# Patient Record
Sex: FEMALE | Race: WHITE | Employment: FULL TIME | URBAN - METROPOLITAN AREA
[De-identification: names, ages, dates, MRNs, and addresses within clinical notes are randomized per-mention and may not be internally consistent; named-entity substitution may affect disease eponyms.]

---

## 2017-01-19 ENCOUNTER — ANESTHESIA EVENT (OUTPATIENT)
Dept: SURGERY | Facility: CLINIC | Age: 57
End: 2017-01-19
Payer: MEDICARE

## 2017-01-25 NOTE — ANESTHESIA PREPROCEDURE EVALUATION
Anesthesia Evaluation     . Pt has had prior anesthetic. Type: General      ROS/MED HX    ENT/Pulmonary:  - neg pulmonary ROS     Neurologic:  - neg neurologic ROS     Cardiovascular:     (+) Dyslipidemia, hypertension----. : . . . :. . Previous cardiac testing date:results:date: results:ECG reviewed date:5/12/14 results:Sinus Rhythm   Low voltage -possible pulmonary disease.     ABNORMAL    date: results:          METS/Exercise Tolerance:     Hematologic:  - neg hematologic  ROS       Musculoskeletal:  - neg musculoskeletal ROS (+) , , other musculoskeletal- left foot hallux valgus, hardware pain      GI/Hepatic:     (+) Other GI/Hepatic hx of gastric bypass      Renal/Genitourinary:  - ROS Renal section negative       Endo:     (+) thyroid problem hypothyroidism, Obesity, .      Psychiatric:     (+) psychiatric history depression and other (comment) (adjustment disorder )      Infectious Disease:  - neg infectious disease ROS       Malignancy:      - no malignancy   Other:    - neg other ROS           Physical Exam  Normal systems: cardiovascular and pulmonary    Airway   Mallampati: I  TM distance: >3 FB  Neck ROM: full    Dental   (+) upper dentures    Cardiovascular       Pulmonary                     Anesthesia Plan      History & Physical Review  History and physical reviewed and following examination; no interval change.    ASA Status:  2 .    NPO Status:  > 8 hours    Plan for General, LMA and Peripheral Nerve Block with Intravenous and Propofol induction. Maintenance will be Balanced.    PONV prophylaxis:  Ondansetron (or other 5HT-3) and Dexamethasone or Solumedrol  Additional equipment: Videolaryngoscope      Postoperative Care  Postoperative pain management:  IV analgesics and Peripheral nerve block (Single Shot).      Consents  Anesthetic plan, risks, benefits and alternatives discussed with:  Patient..                          .

## 2017-01-26 ENCOUNTER — APPOINTMENT (OUTPATIENT)
Dept: GENERAL RADIOLOGY | Facility: CLINIC | Age: 57
End: 2017-01-26
Attending: PODIATRIST
Payer: MEDICARE

## 2017-01-26 ENCOUNTER — HOSPITAL ENCOUNTER (OUTPATIENT)
Facility: CLINIC | Age: 57
Discharge: HOME | End: 2017-01-26
Attending: PODIATRIST | Admitting: PODIATRIST
Payer: MEDICARE

## 2017-01-26 ENCOUNTER — ANESTHESIA (OUTPATIENT)
Dept: SURGERY | Facility: CLINIC | Age: 57
End: 2017-01-26
Payer: MEDICARE

## 2017-01-26 VITALS
RESPIRATION RATE: 16 BRPM | HEIGHT: 61 IN | DIASTOLIC BLOOD PRESSURE: 81 MMHG | BODY MASS INDEX: 27 KG/M2 | HEART RATE: 77 BPM | WEIGHT: 143 LBS | SYSTOLIC BLOOD PRESSURE: 113 MMHG | TEMPERATURE: 98 F | OXYGEN SATURATION: 94 %

## 2017-01-26 DIAGNOSIS — G89.18 POST-OP PAIN: Primary | ICD-10-CM

## 2017-01-26 PROCEDURE — 25000125 ZZHC RX 250: Performed by: NURSE ANESTHETIST, CERTIFIED REGISTERED

## 2017-01-26 PROCEDURE — 25000125 ZZHC RX 250: Performed by: PODIATRIST

## 2017-01-26 PROCEDURE — C1762 CONN TISS, HUMAN(INC FASCIA): HCPCS | Performed by: PODIATRIST

## 2017-01-26 PROCEDURE — 40000306 ZZH STATISTIC PRE PROC ASSESS II: Performed by: PODIATRIST

## 2017-01-26 PROCEDURE — 25800025 ZZH RX 258: Performed by: NURSE ANESTHETIST, CERTIFIED REGISTERED

## 2017-01-26 PROCEDURE — 37000009 ZZH ANESTHESIA TECHNICAL FEE, EACH ADDTL 15 MIN: Performed by: PODIATRIST

## 2017-01-26 PROCEDURE — 36000058 ZZH SURGERY LEVEL 3 EA 15 ADDTL MIN: Performed by: PODIATRIST

## 2017-01-26 PROCEDURE — 40000277 XR SURGERY CARM FLUORO LESS THAN 5 MIN W STILLS: Mod: TC

## 2017-01-26 PROCEDURE — 37000008 ZZH ANESTHESIA TECHNICAL FEE, 1ST 30 MIN: Performed by: PODIATRIST

## 2017-01-26 PROCEDURE — C1713 ANCHOR/SCREW BN/BN,TIS/BN: HCPCS | Performed by: PODIATRIST

## 2017-01-26 PROCEDURE — 27210794 ZZH OR GENERAL SUPPLY STERILE: Performed by: PODIATRIST

## 2017-01-26 PROCEDURE — 71000027 ZZH RECOVERY PHASE 2 EACH 15 MINS: Performed by: PODIATRIST

## 2017-01-26 PROCEDURE — 36000060 ZZH SURGERY LEVEL 3 W FLUORO 1ST 30 MIN: Performed by: PODIATRIST

## 2017-01-26 PROCEDURE — C1769 GUIDE WIRE: HCPCS | Performed by: PODIATRIST

## 2017-01-26 DEVICE — IMP SCR ARTHREX VAL 3.0X14MM TI AR-8933V-14
Type: IMPLANTABLE DEVICE | Site: FOOT | Status: NON-FUNCTIONAL
Removed: 2020-08-06

## 2017-01-26 DEVICE — GRAFT BONE PUTTY DBX 02.5ML 038025: Type: IMPLANTABLE DEVICE | Site: FOOT | Status: FUNCTIONAL

## 2017-01-26 DEVICE — IMP SCR ARTHREX MTP LP CORTICAL 3X16MM TI AR-8933-16
Type: IMPLANTABLE DEVICE | Site: FOOT | Status: NON-FUNCTIONAL
Removed: 2020-08-06

## 2017-01-26 DEVICE — IMPLANTABLE DEVICE
Type: IMPLANTABLE DEVICE | Site: FOOT | Status: NON-FUNCTIONAL
Removed: 2020-08-06

## 2017-01-26 DEVICE — IMP SCR ARTHREX BLUE LOCKING 3.5X22MM AR-8935L-22
Type: IMPLANTABLE DEVICE | Site: FOOT | Status: NON-FUNCTIONAL
Removed: 2020-08-06

## 2017-01-26 DEVICE — IMP PLATE ARTHREX LAPIDUS AR-8941
Type: IMPLANTABLE DEVICE | Site: FOOT | Status: NON-FUNCTIONAL
Removed: 2020-08-06

## 2017-01-26 DEVICE — IMP SCR ARTHREX QUICKFIX CANC PT 3.0X24MM TI AR-8730-24PT
Type: IMPLANTABLE DEVICE | Site: FOOT | Status: NON-FUNCTIONAL
Removed: 2020-08-06

## 2017-01-26 DEVICE — IMP SCR ARTHREX BLUE LOCKING 3.5X16MM AR-8935L-16
Type: IMPLANTABLE DEVICE | Site: FOOT | Status: NON-FUNCTIONAL
Removed: 2020-08-06

## 2017-01-26 DEVICE — IMP SCR ARTHREX QUICKFIX CAN ST 4.0X36MM TI AR-8740-36PTS
Type: IMPLANTABLE DEVICE | Site: FOOT | Status: NON-FUNCTIONAL
Removed: 2020-08-06

## 2017-01-26 DEVICE — IMP SCR ARTHREX CORTICAL 3.5MMX18MM AR-8935-18
Type: IMPLANTABLE DEVICE | Site: FOOT | Status: NON-FUNCTIONAL
Removed: 2020-08-06

## 2017-01-26 DEVICE — IMP PLATE ARTHREX LOW PRO MTP CNTRD SHORT LT TI AR-8944CL-S
Type: IMPLANTABLE DEVICE | Site: FOOT | Status: NON-FUNCTIONAL
Removed: 2020-08-06

## 2017-01-26 RX ORDER — SODIUM CHLORIDE, SODIUM LACTATE, POTASSIUM CHLORIDE, CALCIUM CHLORIDE 600; 310; 30; 20 MG/100ML; MG/100ML; MG/100ML; MG/100ML
1000 INJECTION, SOLUTION INTRAVENOUS CONTINUOUS
Status: DISCONTINUED | OUTPATIENT
Start: 2017-01-26 | End: 2017-01-26 | Stop reason: HOSPADM

## 2017-01-26 RX ORDER — NALOXONE HYDROCHLORIDE 0.4 MG/ML
.1-.4 INJECTION, SOLUTION INTRAMUSCULAR; INTRAVENOUS; SUBCUTANEOUS
Status: DISCONTINUED | OUTPATIENT
Start: 2017-01-26 | End: 2017-01-26 | Stop reason: HOSPADM

## 2017-01-26 RX ORDER — CEFAZOLIN SODIUM 2 G/100ML
2 INJECTION, SOLUTION INTRAVENOUS
Status: COMPLETED | OUTPATIENT
Start: 2017-01-26 | End: 2017-01-26

## 2017-01-26 RX ORDER — HYDROXYZINE HYDROCHLORIDE 25 MG/1
25 TABLET, FILM COATED ORAL EVERY 6 HOURS PRN
Qty: 36 TABLET | Refills: 0 | Status: SHIPPED
Start: 2017-01-26 | End: 2021-06-27

## 2017-01-26 RX ORDER — ONDANSETRON 2 MG/ML
4 INJECTION INTRAMUSCULAR; INTRAVENOUS EVERY 30 MIN PRN
Status: DISCONTINUED | OUTPATIENT
Start: 2017-01-26 | End: 2017-01-26 | Stop reason: HOSPADM

## 2017-01-26 RX ORDER — HYDROXYZINE HYDROCHLORIDE 25 MG/1
25 TABLET, FILM COATED ORAL EVERY 6 HOURS PRN
Status: DISCONTINUED | OUTPATIENT
Start: 2017-01-26 | End: 2017-01-26 | Stop reason: HOSPADM

## 2017-01-26 RX ORDER — FENTANYL CITRATE 50 UG/ML
INJECTION, SOLUTION INTRAMUSCULAR; INTRAVENOUS PRN
Status: DISCONTINUED | OUTPATIENT
Start: 2017-01-26 | End: 2017-01-26

## 2017-01-26 RX ORDER — LIDOCAINE HCL/EPINEPHRINE/PF 2%-1:200K
VIAL (ML) INJECTION PRN
Status: DISCONTINUED | OUTPATIENT
Start: 2017-01-26 | End: 2017-01-26

## 2017-01-26 RX ORDER — MEPERIDINE HYDROCHLORIDE 25 MG/ML
12.5 INJECTION INTRAMUSCULAR; INTRAVENOUS; SUBCUTANEOUS
Status: DISCONTINUED | OUTPATIENT
Start: 2017-01-26 | End: 2017-01-26 | Stop reason: HOSPADM

## 2017-01-26 RX ORDER — HYDROXYZINE HYDROCHLORIDE 50 MG/1
50 TABLET, FILM COATED ORAL EVERY 6 HOURS PRN
Status: DISCONTINUED | OUTPATIENT
Start: 2017-01-26 | End: 2017-01-26 | Stop reason: HOSPADM

## 2017-01-26 RX ORDER — ONDANSETRON 4 MG/1
4 TABLET, ORALLY DISINTEGRATING ORAL EVERY 30 MIN PRN
Status: DISCONTINUED | OUTPATIENT
Start: 2017-01-26 | End: 2017-01-26 | Stop reason: HOSPADM

## 2017-01-26 RX ORDER — OXYCODONE AND ACETAMINOPHEN 5; 325 MG/1; MG/1
1-2 TABLET ORAL EVERY 4 HOURS PRN
Qty: 38 TABLET | Refills: 0 | Status: SHIPPED | OUTPATIENT
Start: 2017-01-26 | End: 2020-08-04

## 2017-01-26 RX ORDER — HYDROMORPHONE HYDROCHLORIDE 1 MG/ML
.3-.5 INJECTION, SOLUTION INTRAMUSCULAR; INTRAVENOUS; SUBCUTANEOUS EVERY 10 MIN PRN
Status: DISCONTINUED | OUTPATIENT
Start: 2017-01-26 | End: 2017-01-26 | Stop reason: HOSPADM

## 2017-01-26 RX ORDER — CEFAZOLIN SODIUM 1 G/3ML
1 INJECTION, POWDER, FOR SOLUTION INTRAMUSCULAR; INTRAVENOUS SEE ADMIN INSTRUCTIONS
Status: DISCONTINUED | OUTPATIENT
Start: 2017-01-26 | End: 2017-01-26 | Stop reason: HOSPADM

## 2017-01-26 RX ORDER — FENTANYL CITRATE 50 UG/ML
25-50 INJECTION, SOLUTION INTRAMUSCULAR; INTRAVENOUS
Status: CANCELLED | OUTPATIENT
Start: 2017-01-26

## 2017-01-26 RX ORDER — PROPOFOL 10 MG/ML
INJECTION, EMULSION INTRAVENOUS CONTINUOUS PRN
Status: DISCONTINUED | OUTPATIENT
Start: 2017-01-26 | End: 2017-01-26

## 2017-01-26 RX ORDER — DEXAMETHASONE SODIUM PHOSPHATE 4 MG/ML
4 INJECTION, SOLUTION INTRA-ARTICULAR; INTRALESIONAL; INTRAMUSCULAR; INTRAVENOUS; SOFT TISSUE EVERY 10 MIN PRN
Status: DISCONTINUED | OUTPATIENT
Start: 2017-01-26 | End: 2017-01-26 | Stop reason: HOSPADM

## 2017-01-26 RX ORDER — ALBUTEROL SULFATE 0.83 MG/ML
2.5 SOLUTION RESPIRATORY (INHALATION) EVERY 4 HOURS PRN
Status: CANCELLED | OUTPATIENT
Start: 2017-01-26

## 2017-01-26 RX ORDER — ROPIVACAINE HYDROCHLORIDE 5 MG/ML
INJECTION, SOLUTION EPIDURAL; INFILTRATION; PERINEURAL PRN
Status: DISCONTINUED | OUTPATIENT
Start: 2017-01-26 | End: 2017-01-26

## 2017-01-26 RX ORDER — FENTANYL CITRATE 50 UG/ML
25-50 INJECTION, SOLUTION INTRAMUSCULAR; INTRAVENOUS
Status: DISCONTINUED | OUTPATIENT
Start: 2017-01-26 | End: 2017-01-26 | Stop reason: HOSPADM

## 2017-01-26 RX ORDER — LIDOCAINE 40 MG/G
CREAM TOPICAL
Status: DISCONTINUED | OUTPATIENT
Start: 2017-01-26 | End: 2017-01-26 | Stop reason: HOSPADM

## 2017-01-26 RX ORDER — OXYCODONE AND ACETAMINOPHEN 5; 325 MG/1; MG/1
1-2 TABLET ORAL
Status: DISCONTINUED | OUTPATIENT
Start: 2017-01-26 | End: 2017-01-26 | Stop reason: HOSPADM

## 2017-01-26 RX ADMIN — MIDAZOLAM HYDROCHLORIDE 3 MG: 1 INJECTION, SOLUTION INTRAMUSCULAR; INTRAVENOUS at 13:28

## 2017-01-26 RX ADMIN — CEFAZOLIN SODIUM 2 G: 2 INJECTION, SOLUTION INTRAVENOUS at 14:23

## 2017-01-26 RX ADMIN — LIDOCAINE HYDROCHLORIDE 1 ML: 10 INJECTION, SOLUTION INFILTRATION; PERINEURAL at 12:43

## 2017-01-26 RX ADMIN — ROPIVACAINE HYDROCHLORIDE 30 ML: 5 INJECTION, SOLUTION EPIDURAL; INFILTRATION; PERINEURAL at 13:36

## 2017-01-26 RX ADMIN — SODIUM CHLORIDE, POTASSIUM CHLORIDE, SODIUM LACTATE AND CALCIUM CHLORIDE: 600; 310; 30; 20 INJECTION, SOLUTION INTRAVENOUS at 15:45

## 2017-01-26 RX ADMIN — FENTANYL CITRATE 100 MCG: 50 INJECTION, SOLUTION INTRAMUSCULAR; INTRAVENOUS at 13:28

## 2017-01-26 RX ADMIN — LIDOCAINE HYDROCHLORIDE,EPINEPHRINE BITARTRATE 5 ML: 20; .005 INJECTION, SOLUTION EPIDURAL; INFILTRATION; INTRACAUDAL; PERINEURAL at 13:35

## 2017-01-26 RX ADMIN — PROPOFOL 100 MCG/KG/MIN: 10 INJECTION, EMULSION INTRAVENOUS at 14:28

## 2017-01-26 RX ADMIN — FENTANYL CITRATE 100 MCG: 50 INJECTION, SOLUTION INTRAMUSCULAR; INTRAVENOUS at 14:47

## 2017-01-26 RX ADMIN — SODIUM CHLORIDE, POTASSIUM CHLORIDE, SODIUM LACTATE AND CALCIUM CHLORIDE 1000 ML: 600; 310; 30; 20 INJECTION, SOLUTION INTRAVENOUS at 12:44

## 2017-01-26 RX ADMIN — ROPIVACAINE HYDROCHLORIDE 10 ML: 5 INJECTION, SOLUTION EPIDURAL; INFILTRATION; PERINEURAL at 13:37

## 2017-01-26 NOTE — IP AVS SNAPSHOT
MRN:[id number]                      After Visit Summary   1/26/2017    Zuleyka Lou    MRN: [id number]           Thank you!     Thank you for choosing [facility] for your care. Our goal is always to provide you with excellent care. Hearing back from our patients is one way we can continue to improve our services. Please take a few minutes to complete the written survey that you may receive in the mail after you visit with us. Thank you!        Patient Information     Date Of Birth          1960        About your hospital stay     You were admitted on:  January 26, 2017 You last received care in the:  [facility] PreOP/Phase II    You were discharged on:  January 26, 2017       Who to Call     For medical emergencies, please call 911.  For non-urgent questions about your medical care, please call your primary care provider or clinic, [phone]  For questions related to your surgery, please call your surgery clinic        Attending Provider     Provider    Ant Webber DPM       Primary Care Provider Office Phone # Fax #    [facility] [phone]       [facility] [facility] [address]        After Care Instructions     Discharge Instructions       Review discharge instructions as directed by Provider.            Discharge Instructions       Patient to be seen in next 10-14 days.    Please call [facility] at [phone] to make/confirm appointment.            Elevate affected extremity           Ice to affected area       Ice pack to affected area PRN (as needed).            No dressing change       until follow up clinic appointment.            No driving or operating machinery       until the day after procedure            No weight bearing                 Further instructions from your care team                           Same Day Surgery Discharge Instructions  Special Precautions After Surgery - Adult    1. It is not unusual to  feel lightheaded or faint, up to 24 hours after surgery or while taking pain medication.  If you have these symptoms; sit for a few minutes before standing and have someone assist you when getting up.  2. You should rest and relax for the next 24 hours and must have someone stay with you for at least 24 hours after your discharge.  3. DO NOT DRIVE any vehicle or operate mechanical equipment for 24 hours following the end of your surgery.  DO NOT DRIVE while taking narcotic pain medications that have been prescribed by your physician.  If you had a limb operated on, you must be able to use it fully to drive.  4. DO NOT drink alcoholic beverages for 24 hours following surgery or while taking prescription pain medication.  5. Drink clear liquids (apple juice, ginger ale, broth, 7-Up, etc.).  Progress to your regular diet as you feel able.  6. Any questions call your physician and do not make important decisions for 24 hours.    ACTIVITY  ? Off your foot the 1st day or two with foot elevated above heart as much as possible then up as tolerated.     INCISIONAL CARE  ? Cover foot with a plastic bag to keep dry to shower.     Keep appointment to return to the clinic.    Medications:  ? Start a pain pill as soon as you start to feel any pain but at the very latest before bed, then set an alarm thru the 1st night for every 4 hours to check on patient & give a pain pill, may take them as needed the next day.     Additional discharge instructions: Cold pack behind knee to cool blood going to the foot to decrease swelling & pain.  __________________________________________________________________________________________________________________________________  IMPORTANT NUMBERS:    [facility] [place]  [phone], [phone]  Pharmacy:  [phone]  Same Day Surgery:  [phone], Monday - Friday until 8:30 p.m.  Urgent Care:  [phone]  Emergency Room:  [phone]      [facility]:  [phone]                "                                                              [facility]:  [phone] option 1  [facility]:  [phone]     OB Clinic:  [phone]   Surgery Specialty Clinic:  [phone]   Home Medical Equipment: [phone]  [facility]:  [phone]        Pending Results     No orders found from 2017 to 2017.            Admission Information        Provider Department Dept Phone    2017 Ant Webber DPM Wy Preop/Phase [phone]      Your Vitals Were     Blood Pressure Pulse Temperature    115/78 mmHg 77 98  F (36.7  C) (Oral)    Respirations Height Weight    16 1.549 m (5' 1\") 64.864 kg (143 lb)    BMI (Body Mass Index) Pulse Oximetry       27.03 kg/m2 95%       MyChart Information     [facility] lets you send messages to your doctor, view your test results, renew your prescriptions, schedule appointments and more. To sign up, go to [email or web address] . Click on \"Log in\" on the left side of the screen, which will take you to the Welcome page. Then click on \"Sign up Now\" on the right side of the page.     You will be asked to enter the access code listed below, as well as some personal information. Please follow the directions to create your username and password.     Your access code is: [email or web address]  Expires: 2017  4:38 PM     Your access code will  in 90 days. If you need help or a new code, please call your [facility] clinic or [phone].        Care EveryWhere ID     This is your Care EveryWhere ID. This could be used by other organizations to access your [facility] medical records  [id number]           Review of your medicines      START taking        Dose / Directions    hydrOXYzine 25 MG tablet   Commonly known as:  ATARAX   Used for:  Post-op pain        Dose:  25 mg   Take 1 tablet (25 mg) by mouth every 6 hours as needed for itching (and nausea)   Quantity:  36 tablet   Refills:  0       " oxyCODONE-acetaminophen 5-325 MG per tablet   Commonly known as:  PERCOCET   Used for:  Post-op pain        Dose:  1-2 tablet   Take 1-2 tablets by mouth every 4 hours as needed for pain (moderate to severe)   Quantity:  38 tablet   Refills:  0         CONTINUE these medicines which have NOT CHANGED        Dose / Directions    AMLODIPINE BESYLATE PO        Dose:  10 mg   Take 10 mg by mouth every evening   Refills:  0       aspirin 325 MG EC tablet   Used for:  Status post total right knee replacement        Dose:  325 mg   Take 1 tablet (325 mg) by mouth daily   Quantity:  42 tablet   Refills:  0       FLUoxetine 20 MG tablet        Dose:  20 mg   Take 20 mg by mouth daily   Refills:  0       LEVOTHYROXINE SODIUM PO        Dose:  250 mcg   Take 250 mcg by mouth daily   Refills:  0       MELATONIN PO        Dose:  3 mg   Take 3 mg by mouth nightly as needed   Refills:  0       MULTIVITAMIN/IRON PO        Dose:  1 tablet   Take 1 tablet by mouth daily   Refills:  0       * order for DME        Equipment being ordered: continuous passive motion machine   Quantity:  1 Device   Refills:  0       * order for DME   Used for:  Closed fracture of lateral portion of right tibial plateau, initial encounter        Equipment being ordered: Other: CPM Machine Treatment Diagnosis: ORIF right tibial plateau fracture Set max tolerance and increase 2-3 degrees/day as tolerated. Use up to 8 hours daily.   Quantity:  1 Device   Refills:  0       oxyCODONE 5 MG IR tablet   Commonly known as:  ROXICODONE   Used for:  Status post total right knee replacement        Dose:  5-10 mg   Take 1-2 tablets (5-10 mg) by mouth every 3 hours as needed for moderate to severe pain   Quantity:  60 tablet   Refills:  0       TRAZODONE HCL PO        Dose:  100 mg   Take 100 mg by mouth At Bedtime   Refills:  0       VITAMIN B-12 SL        Dose:  1 tablet   Place 1 tablet under the tongue daily   Refills:  0       * Notice:  This list has 2 medication(s)  that are the same as other medications prescribed for you. Read the directions carefully, and ask your doctor or other care provider to review them with you.         Where to get your medicines      These medications were sent to [facility] - [place], [place] - [address]  [address] [facility] [address]     Phone:  [phone]    - hydrOXYzine 25 MG tablet      Some of these will need a paper prescription and others can be bought over the counter. Ask your nurse if you have questions.     Bring a paper prescription for each of these medications    - oxyCODONE-acetaminophen 5-325 MG per tablet             Protect others around you: Learn how to safely use, store and throw away your medicines at www.disposemymeds.org.             Medication List: This is a list of all your medications and when to take them. Check marks below indicate your daily home schedule. Keep this list as a reference.      Medications           Morning Afternoon Evening Bedtime As Needed    AMLODIPINE BESYLATE PO   Take 10 mg by mouth every evening                                aspirin 325 MG EC tablet   Take 1 tablet (325 mg) by mouth daily                                FLUoxetine 20 MG tablet   Take 20 mg by mouth daily                                hydrOXYzine 25 MG tablet   Commonly known as:  ATARAX   Take 1 tablet (25 mg) by mouth every 6 hours as needed for itching (and nausea)                                LEVOTHYROXINE SODIUM PO   Take 250 mcg by mouth daily                                MELATONIN PO   Take 3 mg by mouth nightly as needed                                MULTIVITAMIN/IRON PO   Take 1 tablet by mouth daily                                * order for DME   Equipment being ordered: continuous passive motion machine                                * order for DME   Equipment being ordered: Other: CPM Machine Treatment Diagnosis: ORIF right tibial plateau fracture Set max tolerance and increase 2-3  degrees/day as tolerated. Use up to 8 hours daily.                                oxyCODONE 5 MG IR tablet   Commonly known as:  ROXICODONE   Take 1-2 tablets (5-10 mg) by mouth every 3 hours as needed for moderate to severe pain                                oxyCODONE-acetaminophen 5-325 MG per tablet   Commonly known as:  PERCOCET   Take 1-2 tablets by mouth every 4 hours as needed for pain (moderate to severe)                                TRAZODONE HCL PO   Take 100 mg by mouth At Bedtime                                VITAMIN B-12 SL   Place 1 tablet under the tongue daily                                * Notice:  This list has 2 medication(s) that are the same as other medications prescribed for you. Read the directions carefully, and ask your doctor or other care provider to review them with you.

## 2017-01-26 NOTE — ANESTHESIA CARE TRANSFER NOTE
Patient: Zuleyka Lou    ARTHRODESIS TOE(S) (Left Toe)  Additional InformationProcedure(s):  Left foot: revision 1st tarsal metatarsal fusion hardware removal, 1st metatarsal phalangeal joint fusion, bone grafting - Wound Class: I-Clean    Diagnosis: Left foot hallux balgus/rigidus, hardware pain  Diagnosis Additional Information: No value filed.    Anesthesia Type:   General, LMA, Peripheral Nerve Block     Note:  Airway :Room Air  Patient transferred to:Phase II        Vitals: (Last set prior to Anesthesia Care Transfer)              Electronically Signed By: WILMAR Piedra CRNA  January 26, 2017  4:12 PM

## 2017-01-26 NOTE — DISCHARGE INSTRUCTIONS
Same Day Surgery Discharge Instructions  Special Precautions After Surgery - Adult    1. It is not unusual to feel lightheaded or faint, up to 24 hours after surgery or while taking pain medication.  If you have these symptoms; sit for a few minutes before standing and have someone assist you when getting up.  2. You should rest and relax for the next 24 hours and must have someone stay with you for at least 24 hours after your discharge.  3. DO NOT DRIVE any vehicle or operate mechanical equipment for 24 hours following the end of your surgery.  DO NOT DRIVE while taking narcotic pain medications that have been prescribed by your physician.  If you had a limb operated on, you must be able to use it fully to drive.  4. DO NOT drink alcoholic beverages for 24 hours following surgery or while taking prescription pain medication.  5. Drink clear liquids (apple juice, ginger ale, broth, 7-Up, etc.).  Progress to your regular diet as you feel able.  6. Any questions call your physician and do not make important decisions for 24 hours.    ACTIVITY  ? Off your foot the 1st day or two with foot elevated above heart as much as possible then up as tolerated.     INCISIONAL CARE  ? Cover foot with a plastic bag to keep dry to shower.     Keep appointment to return to the clinic.    Medications:  ? Start a pain pill as soon as you start to feel any pain but at the very latest before bed, then set an alarm thru the 1st night for every 4 hours to check on patient & give a pain pill, may take them as needed the next day.     Additional discharge instructions: Cold pack behind knee to cool blood going to the foot to decrease swelling & pain.  __________________________________________________________________________________________________________________________________  IMPORTANT NUMBERS:    [facility] [place] Main Number:  [phone], [phone]  Pharmacy:  [phone]  Same Day Surgery:  [phone], Monday -  Friday until 8:30 p.m.  Urgent Care:  [phone]  Emergency Room:  [phone]      [facility]:  [phone]                                                                             [facility]:  [phone] option 1  [facility]:  [phone]     OB Clinic:  [phone]   Surgery Specialty Clinic:  [phone]   Home Medical Equipment: [phone]  [facility]:  [phone]

## 2017-01-26 NOTE — BRIEF OP NOTE
[facility] OR   Brief Operative Note    Pre-operative diagnosis: Left foot hallux balgus/rigidus, hardware pain   Post-operative diagnosis * No post-op diagnosis entered *   Procedure: Procedure(s):  Left foot 1st Metatarsophalangeal realignment arthrodesis, Tarsometatarsal revision arthrodesis and hardware removal-choice anes popliteal nerve block - Wound Class: I-Clean   Surgeon: Ant Webber DPM   Anesthesia: Combined General with Popliteal Block    Estimated blood loss: Less than 10 ml   Blood transfusion: No transfusion was given during surgery   Drains: None   Specimens: None   Findings: Left foot: 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  Thin cortical bone appreciated.  Hallux varus with adaptive joint changes and arthrosis about the 1st MTPJ.   Complications: None   Condition: Stable   Comments: See dictated operative report for full details.           Ant Webber DPM, FACFAS  Foot & Ankle Surgeon/Specialist  [facility]

## 2017-01-26 NOTE — ANESTHESIA PROCEDURE NOTES
Peripheral nerve/Neuraxial procedure note : sciatic, saphenous and popliteal  Pre-Procedure  Performed by  AILYN WISEMAN   Location: pre-op    Procedure Times:1/26/2017 1:25 PM and 1/26/2017 1:41 PM  Pre-Anesthestic Checklist: patient identified, IV checked, site marked, risks and benefits discussed, informed consent, monitors and equipment checked, pre-op evaluation, at physician/surgeon's request and post-op pain management    Timeout  Correct Patient: Yes   Correct Procedure: Yes   Correct Site: Yes   Correct Laterality: Yes   Correct Position: Yes   Site Marked: Yes   .   Procedure Documentation    .    Procedure:    Sciatic, saphenous and popliteal.  Local skin infiltrated with mL of 1% lidocaine.     Ultrasound used to identify targeted nerve, plexus, or vascular marker and placed a needle adjacent to it. A permanent image is entered into the patient's record.  Patient Prep;mask, sterile gloves, chlorhexidine gluconate and isopropyl alcohol, patient draped.  Nerve Stim: Initial Level 1 mA.  Lowest motor response 0.42 mA..  Needle: insulated, short bevel (20 G. 80 mm 4 in). .  Spinal Needle: . . Insertion Method: Single Shot.     Assessment/Narrative  Paresthesias: No.  Injection made incrementally with aspirations every 5 mL..  The placement was negative for: blood aspirated, painful injection and site bleeding.  Bolus given via needle. No blood aspirated via catheter.   Secured via.   Complications: none. Test dose of 5 mL lidocaine 2% w/ 1:200,000 epinephrine at 13:35.  Test dose negative for signs of intravascular, subdural or intrathecal injection. Comments:  Total volume injected at Sciatic nerve:30    Total volume injected at Saphenous Nerve:10

## 2017-01-26 NOTE — IP AVS SNAPSHOT
[facility] PreOP/Phase II    [address] [address]    Phone:  [phone]    Fax:  [phone]                                       After Visit Summary   1/26/2017    Zuleyka Lou    MRN: [id number]           After Visit Summary Signature Page     I have received my discharge instructions, and my questions have been answered. I have discussed any challenges I see with this plan with the nurse or doctor.    ..........................................................................................................................................  Patient/Patient Representative Signature      ..........................................................................................................................................  Patient Representative Print Name and Relationship to Patient    ..................................................               ................................................  Date                                            Time    ..........................................................................................................................................  Reviewed by Signature/Title    ...................................................              ..............................................  Date                                                            Time

## 2017-01-26 NOTE — ANESTHESIA POSTPROCEDURE EVALUATION
Patient: Zuleyka Lou    ARTHRODESIS TOE(S) (Left Toe)  Additional InformationProcedure(s):  Left foot: revision 1st tarsal metatarsal fusion hardware removal, 1st metatarsal phalangeal joint fusion, bone grafting - Wound Class: I-Clean    Diagnosis:Left foot hallux balgus/rigidus, hardware pain  Diagnosis Additional Information: No value filed.    Anesthesia Type:  General, LMA, Peripheral Nerve Block    Note:  Anesthesia Post Evaluation    Patient location during evaluation: Bedside  Patient participation: Able to fully participate in evaluation  Level of consciousness: awake  Pain management: adequate  Airway patency: patent  Cardiovascular status: stable  Respiratory status: room air  Hydration status: stable  PONV: none     Anesthetic complications: None          Last vitals:  Filed Vitals:    01/26/17 1221 01/26/17 1347 01/26/17 1412   BP: 129/82 119/78 115/71   Pulse: 83 82 77   Temp: 36.7  C (98  F)     Resp: 16 16 16   SpO2: 93% 99% 100%       Electronically Signed By: WILMAR Piedra CRNA  January 26, 2017  4:13 PM

## 2017-01-26 NOTE — OP NOTE
Operative report will be dictated/completed.    Ant Webber DPM, FACFAS  Foot & Ankle Surgeon/Specialist  [facility]

## 2017-01-27 NOTE — OP NOTE
DATE OF SURGERY:  01/26/2017      SURGEON:  Ant Webber DPM, [facility]      ASSISTANT:  Francisco Dawkins, PGY3      PREOPERATIVE DIAGNOSIS:   1.  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   2.  Left hallux varus deformity contracture with pain.   3.  Osteoporosis, osteopenia.      POSTOPERATIVE DIAGNOSIS:   1.  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   2.  Left hallux varus deformity contracture and associated arthrosis  3.  Osteoporosis, osteopenia.      PROCEDURE PERFORMED:   1.  Revision, left first tarsometatarsal arthrodesis and autogenous bone grafting.   2.  Hardware removal, left first tarsometatarsal joint.   3.  Left first metatarsophalangeal joint realignment arthrodesis to address hallux varus with autogenous bone grafting.   4.  Intraoperative fluoroscopy.   5.  Posterior splint application below the knee, ankle neutral, fiberglass     HEMOSTASIS:  Left ankle tourniquet 250 mmHg.      ESTIMATED BLOOD LOSS:  Less than 10 cc.      FINDINGS:  Compromised hardware, left first tarsometatarsal joint, with three broken screws.  Gross nonunion about the first tarsometatarsal joint with osteopenia and osteoporosis appreciated.  Distally, the first metatarsophalangeal joint demonstrated fixed hallux varus with underlying arthrosis.      IMPLANTS:  Arthrex 4.0 compression screw from plantar distal to proximal dorsal with a medial-sided locking lateral plate.  To fixate the first metatarsophalangeal joint, a 3.0 compression screw and dorsal anatomic locking contour plate were utilized.  Autogenous bone grafting and bone filler utilized at the first tarsometatarsal joint as well as the first metatarsophalangeal joint.      INDICATIONS FOR THE OPERATION:  Ms. Zuleyka Lou is a 57-year-old who has been followed and evaluated in clinic for a left foot failed tarsometatarsal arthrodesis and hallux varus.  She has failed multiple forms of nonoperative care for  this, and elected to pursue surgical treatment after thorough discussion of the associated pros, cons, risks, benefits, alternatives, postoperative course and details.  She had a CT scan confirming the nonunion and compromised hardware as well.  She gave verbal and written consent for the procedure.      DESCRIPTION OF THE PROCEDURE:  The patient was identified in the preoperative holding area.  The surgical site was marked, the extremity initialed, H&P reviewed, and consent confirmed.  She was then transported to the OR, and placed supine on the OR table.  IV antibiotics were administered, monitored anesthesia care was administered, and a preoperative popliteal block was performed by Anesthesia.  The left foot and ankle were prepped and draped sterilely.  Timeout was performed to identify the proper patient, surgical site, and procedure to be performed.  Esmarch was utilized to exsanguinate the left foot, and the ankle tourniquet inflated for the duration of the procedure.  A dorsomedial incision was made about the first tarsometatarsal joint extending distally to the MTPJ.  This was dissected down in layers with neurovascular identification and retraction.  Proximally, the plate was dissected out and was found to be grossly compromised.  The distal screws were readily removable.  The proximal screws were removed with the screw removal set.  The compression screw did require some curettage dorsally, and this was able to be removed.  It was compromised.  There was gross nonunion appreciated at the first tarsometatarsal joint.  This was openly debrided with a sagittal saw and removal of remaining nonviable bone.  Additional preparation was completed with fish-scaling and subchondral bone drilling.  This was combined with some autogenous bone from the area as well as with DBX bone filler packed back into the joint.  The compression screw was then placed with intraoperative fluoroscopic assistance from plantar distal  to proximal dorsal about the joint for compression.  This was then augmented medially with a Lapidus arthrodesis plate combined with locking and nonlocking screws for additional compression and alignment about the joint.  Stable and rigid internal fixation with anatomic alignment appreciated.  This site was then irrigated and closed proximally with 2-0 and 3-0 Vicryl and 3-0 nylon.  The excision was then distal.  The metatarsophalangeal joint was openly inspected, and it was found to be with gross hallux varus contracture and adaptive arthritic changes.  This was openly dissected.  Then we repaired the joint for arthrodesis.  Cup and cone reaming with 20 mm cup and cone reamers was conducted to violate the subchondral bone to allow for bone fusion.  This was further fenestrated with a 2.5 drill bit.  It was then aligned anatomically, and a 3.0 compression screw was placed, and a dorsal anatomic locking plate was applied with locking and nonlocking screws.  The site of the arthrodesis was confirmed with intraoperative fluoroscopy.  There was some first ray shortening appreciated.  This site was then all irrigated, and closure completed in layers with 2-0 and 3-0 Vicryl and 3-0 nylon, and a compressive sterile dressing was applied.  Vascular status was intact with deflation of the tourniquet.  Then, a posterior neutral ankle splint below the knee, fiberglass ankle neutral was applied.      Case and care reviewed today with the patient and family member postoperatively.  Postoperative instruction sheet was provided.  Orders were placed for p.o. pain management, DVT prophylaxis, and nonweightbearing assistive devices.  Clinical follow-up in 10-14 days for recheck and suture removal, and they will contact the clinic with any other interval events or concerns.         MIRA RAND DPM             D: 01/26/2017 16:20   T: 01/27/2017 03:39   MT: [id number]      Name:     VASU DILLON   MRN:      [id number]        Account:         [id number]   :      1960           Procedure Date: 2017      Document: [id number]       cc: [facility]

## 2019-09-27 ENCOUNTER — HOSPITAL ENCOUNTER (OUTPATIENT)
Facility: CLINIC | Age: 59
End: 2019-09-27
Attending: SURGERY | Admitting: SURGERY
Payer: MEDICARE

## 2019-12-30 ENCOUNTER — HOSPITAL ENCOUNTER (OUTPATIENT)
Dept: ULTRASOUND IMAGING | Facility: CLINIC | Age: 59
Discharge: HOME | End: 2019-12-30
Attending: PHYSICIAN ASSISTANT | Admitting: PHYSICIAN ASSISTANT
Payer: MEDICARE

## 2019-12-30 DIAGNOSIS — I80.02 SUPERFICIAL PHLEBITIS OF LEFT LEG: ICD-10-CM

## 2019-12-30 PROCEDURE — 93971 EXTREMITY STUDY: CPT | Mod: LT

## 2020-07-30 DIAGNOSIS — Z11.59 ENCOUNTER FOR SCREENING FOR OTHER VIRAL DISEASES: Primary | ICD-10-CM

## 2020-08-04 DIAGNOSIS — Z11.59 ENCOUNTER FOR SCREENING FOR OTHER VIRAL DISEASES: ICD-10-CM

## 2020-08-04 PROCEDURE — U0003 INFECTIOUS AGENT DETECTION BY NUCLEIC ACID (DNA OR RNA); SEVERE ACUTE RESPIRATORY SYNDROME CORONAVIRUS 2 (SARS-COV-2) (CORONAVIRUS DISEASE [COVID-19]), AMPLIFIED PROBE TECHNIQUE, MAKING USE OF HIGH THROUGHPUT TECHNOLOGIES AS DESCRIBED BY CMS-2020-01-R: HCPCS | Performed by: PODIATRIST

## 2020-08-05 ENCOUNTER — ANESTHESIA EVENT (OUTPATIENT)
Dept: SURGERY | Facility: CLINIC | Age: 60
End: 2020-08-05
Payer: COMMERCIAL

## 2020-08-05 LAB
SARS-COV-2 RNA SPEC QL NAA+PROBE: NOT DETECTED
SPECIMEN SOURCE: NORMAL

## 2020-08-05 NOTE — BRIEF OP NOTE
[facility]   Brief Operative Note    Pre-operative diagnosis: Hallux valgus of left foot [M20.11]   Post-operative diagnosis * No post-op diagnosis entered *   Procedure: Procedure(s):  Hallux Interphalangeal Joint Resection Arthroplasty,Extensor Tendon Lengthening,Flexor Tendon Evaluationl  1st Metatarsophalangeal Joint and Tarsometatarsal Hardware Removal   Surgeon: Ant Webber DPM   Anesthesia: Monitor Anesthesia Care    Estimated blood loss: 50 mL   Blood transfusion: No transfusion was given during surgery   Drains: None   Specimens:  None, hardware removed.   Findings:  Left hallux IPJ dorsiflexion contracture post prior MTPJ and TMT fusion with extensor contracture and flexor instability associated with contracture.  Left fourth toe adductovarus contracture with interdigital impingement, osseous   Complications: No direct complications encountered throughout procedures.   Condition: Stable  Transferred to post-anesthesia recovery   Comments: See dictated operative report for full details.           Ant Webber DPM, FACFAS  Foot & Ankle Surgeon/Specialist  [facility]

## 2020-08-05 NOTE — OP NOTE
[facility] ORTHOPEDICS OPERATIVE REPORT  Operative Report - Orthopedics  Zuleyka Lou,  1960, MRN [id number]    Surgery Date: 20    PCP: Clinic, [facility], [phone]   Code status:  Full Code       OPERATION SITE:  [facility] Operating Room       OPERATIVE REPORT  DR. ANT WEBBER  FOOT & ANKLE SURGEON  [facility]    DATE OF PROCEDURE: 20    SITE: [facility] Operating Room    SURGEON: Dr. Ant Webber - [facility]    ASSISTANT: Octavio Greene-  Surgeon - [facility]      Pre-Operative Diagnosis:  1.  Post prior left MTPJ fusion, first TMT fusion with retained hardware, hardware associated pain post multiple revision surgeries  2.  Left hallux IPJ extension contracture  3.  Left fourth toe adductovarus contracture    Post-Operative Diagnosis:  1.  Post prior left MTPJ fusion, first TMT fusion with retained hardware, hardware associated pain post multiple revision surgeries  2.  Left hallux IPJ extension contracture  3.  Left fourth toe adductovarus contracture    Procedures Performed:  1.  Deep hardware removal left foot 2 sites, prior MTPJ fusion site and TMT fusion site plate and screws  2.  Left hallux IPJ resection arthroplasty extensor z tenotomy lengthening  3.  Left fourth toe PIPJ realignment arthrodesis with inferior flexor tendon release tenotomy middle level middle phalanx  4.  Intraoperative fluoroscopy    Anesthesia: Monitored Anesthesia Care with Local/Regional  Hemostasis: Ankle Tourniquet at 250mmHg  EBL: < 10 mL  Findings:  Left hallux IPJ dorsiflexion contracture post prior MTPJ and TMT fusion with extensor contracture and flexor instability associated with contracture.  Left fourth toe adductovarus contracture with interdigital impingement, osseous  Implants: None.  Hardware removed.  Specimens: None    Indications for the Operation:  The patient has been seen and evaluated in clinic for  the above-mentioned diagnoses.  They have failed to respond to nonoperative care measures and/or surgical care for condition was indicated.  They have elected to proceed as recommended/indicated with surgical care after a thorough discussion of the associated pros, cons, risks and benefits of the operations as well as the postoperative course and details.  All associated questions were answered.  Verbal and written form informed consent was obtained.  Please see additional information within the clinical notes.    Description of the Procedure:  Patient was seen and evaluated in the preoperative holding area.  The surgical site was marked.  The consent was signed.  The H&P was updated/reviewed.  Patient was transported from the preoperative holding area to the surgical suite.  The patient was placed on the operative table.  Anesthesia was obtained.  Antibiotics were administered via IV.  Tourniquet was applied.  The operative extremity was prepped and draped sterilely.  Then, a timeout was performed to identify the proper patient, surgical site and the procedures to be performed.  Local anesthetic was infiltrated about the operative margins for regional blockade utilizing a one-to-one mixture of 2% lidocaine plain and 0.5% Marcaine plain approximately 30 cc of the mixture was utilized.  The foot/ankle was exsanguinated, and the tourniquet was inflated.    Attention was then directed to the left foot.  Dorsal medial incision made about the first TMT and MTPJ following prior incisional lines.  This was completed with a surgical #15 blade.  This was then dissected out in layers with neurovascular notification and retraction.  Careful dissection down to the periosteum on the plate of the first TMT and MTPJ was conducted.  The plate and screws as well as compression screws were able to be removed in total utilizing the appropriate drivers.  Confirmatory x-ray confirmed complete removal.  Then the incision was extended to  the dorsal hallux IPJ.  Resection of the distal aspect of the proximal phalanx was conducted.  The extensor tendon was tenotomized with a Z tenotomy lengthening. Then after thorough irrigation layered closure completed with the tone foot neutral position 2-0 and 3-0 Vicryl with 3-0 nylon.  Attention was then directed to the left fourth toe adductovarus contracture with interdigital impingement.  Dorsal incision made with a 15 blade at the level of the PIPJ.  This was dissected down a transverse incision was made at the PIPJ.  And approximated resection of the distal phalanx was conducted with an appropriately sized sagittal saw to correct the toe, taken out of adductovarus.  An inferior flexor tendon release was also conducted at the base of the fourth toe, middle and middle phalanx with a percutaneous #15 blade approach.  This improve the position of the fifth toe out of adductovarus and improve the interdigital impingement appreciated between the fourth and fifth digits that was quite symptomatic for the patient.    Following this, thorough irrigation of the surgical sites was conducted.  Layered, anatomic closure completed with 2-0 Vicryl, 3-0 Vicryl and 3-0 nylon with careful apposition of the skin and surfaces for primary healing.  A compressive sterile splint/dressing was applied.  Vascular status was intact after deflation of the tourniquet.  COMPLICATIONS: No direct complications encountered throughout the case.    The patient tolerated the procedure & anesthesia well.  They were transported from the operative suite to the postoperative holding area.  The patient was given postoperative orders as well as specific postoperative instructions which were reviewed by the nursing staff.  Orders were placed for weightbearing status/activity, postoperative oral pain management, DVT prophylaxis measures both with mechanical and medicinal measures reviewed.  Splint/dressing care measures were reviewed as well as  appropriate cryotherapy measures and nutrition.  Postoperative follow-up to be conducted in the next 10-14 days for outpatient clinical follow-up in the Orthopedic clinic at [facility].  If concerns or questions arise or develop they will contact our clinic and postoperative contact numbers provided.  Case details and post-operative care requirements reviewed with family/support present today.  Additionally, a detailed postoperative instruction sheet was provided to the patient and family.  All additional questions were answered postoperatively.    Post Operative Plan:  1. Activity: Weightbearing as tolerated in protective surgical shoe with assistive devices  2. Assistive Devices: Cane or walker recommended at this time.  3. Pain Management: PO pain management prescribed as reviewed in pre-op with patient.  4. Dressing Care: Postoperative splint-dressing to remain clean dry and in place or be changed in 7 to 10 days as instructed.  5. DVT prevention: knee exercises and ankle pump exercises reviewed.  ASA as prescribed.    6. Infection prevention: pre-op IV antibiotics completed.  PO Rx as indicated.  7. Disposition: to home self care with assistance and assistive devices.  8. Clinical Follow-up: as arranged from clinic in 10-14 days post op.    Please note that this report was completed with the assistance of voice recognition and transcription services.  Although every effort has been made to correct and avoid errors, errors may remain.    Dr. Ant Webber, DPM, [facility]  Foot & Ankle Surgeon/Specialist  [facility]          CC: [facility], Dr. Webber's Clinical Team

## 2020-08-06 ENCOUNTER — ANESTHESIA (OUTPATIENT)
Dept: SURGERY | Facility: CLINIC | Age: 60
End: 2020-08-06
Payer: COMMERCIAL

## 2020-08-06 ENCOUNTER — HOSPITAL ENCOUNTER (OUTPATIENT)
Facility: CLINIC | Age: 60
Discharge: HOME | End: 2020-08-06
Attending: PODIATRIST | Admitting: PODIATRIST
Payer: COMMERCIAL

## 2020-08-06 ENCOUNTER — APPOINTMENT (OUTPATIENT)
Dept: GENERAL RADIOLOGY | Facility: CLINIC | Age: 60
End: 2020-08-06
Attending: PODIATRIST
Payer: COMMERCIAL

## 2020-08-06 VITALS
TEMPERATURE: 98.5 F | SYSTOLIC BLOOD PRESSURE: 118 MMHG | RESPIRATION RATE: 15 BRPM | DIASTOLIC BLOOD PRESSURE: 80 MMHG | HEART RATE: 66 BPM | HEIGHT: 62 IN | BODY MASS INDEX: 31.47 KG/M2 | WEIGHT: 171 LBS | OXYGEN SATURATION: 95 %

## 2020-08-06 DIAGNOSIS — G89.18 ACUTE POST-OPERATIVE PAIN: Primary | ICD-10-CM

## 2020-08-06 PROCEDURE — 25800030 ZZH RX IP 258 OP 636: Performed by: NURSE ANESTHETIST, CERTIFIED REGISTERED

## 2020-08-06 PROCEDURE — 40000277 XR SURGERY CARM FLUORO LESS THAN 5 MIN W STILLS

## 2020-08-06 PROCEDURE — 37000008 ZZH ANESTHESIA TECHNICAL FEE, 1ST 30 MIN: Performed by: PODIATRIST

## 2020-08-06 PROCEDURE — 37000009 ZZH ANESTHESIA TECHNICAL FEE, EACH ADDTL 15 MIN: Performed by: PODIATRIST

## 2020-08-06 PROCEDURE — C1762 CONN TISS, HUMAN(INC FASCIA): HCPCS | Performed by: PODIATRIST

## 2020-08-06 PROCEDURE — 25000125 ZZHC RX 250: Performed by: NURSE ANESTHETIST, CERTIFIED REGISTERED

## 2020-08-06 PROCEDURE — 25000128 H RX IP 250 OP 636: Performed by: PODIATRIST

## 2020-08-06 PROCEDURE — 25000128 H RX IP 250 OP 636: Performed by: NURSE ANESTHETIST, CERTIFIED REGISTERED

## 2020-08-06 PROCEDURE — 25000132 ZZH RX MED GY IP 250 OP 250 PS 637: Performed by: PODIATRIST

## 2020-08-06 PROCEDURE — 36000058 ZZH SURGERY LEVEL 3 EA 15 ADDTL MIN: Performed by: PODIATRIST

## 2020-08-06 PROCEDURE — 25000132 ZZH RX MED GY IP 250 OP 250 PS 637: Performed by: NURSE ANESTHETIST, CERTIFIED REGISTERED

## 2020-08-06 PROCEDURE — 40000305 ZZH STATISTIC PRE PROC ASSESS I: Performed by: PODIATRIST

## 2020-08-06 PROCEDURE — 27210794 ZZH OR GENERAL SUPPLY STERILE: Performed by: PODIATRIST

## 2020-08-06 PROCEDURE — 25000125 ZZHC RX 250: Performed by: PODIATRIST

## 2020-08-06 PROCEDURE — 36000060 ZZH SURGERY LEVEL 3 W FLUORO 1ST 30 MIN: Performed by: PODIATRIST

## 2020-08-06 PROCEDURE — C1713 ANCHOR/SCREW BN/BN,TIS/BN: HCPCS | Performed by: PODIATRIST

## 2020-08-06 PROCEDURE — 71000027 ZZH RECOVERY PHASE 2 EACH 15 MINS: Performed by: PODIATRIST

## 2020-08-06 DEVICE — GRAFT BONE PUTTY ARTHREX DBM STIMUBLAST 3ML ABS-2004-03: Type: IMPLANTABLE DEVICE | Site: FOOT | Status: FUNCTIONAL

## 2020-08-06 DEVICE — IMP PIN ARTHREX TRIM IT 1.5X100MM AR-4151DS: Type: IMPLANTABLE DEVICE | Site: FOOT | Status: FUNCTIONAL

## 2020-08-06 RX ORDER — CEFAZOLIN SODIUM 1 G/50ML
1 INJECTION, SOLUTION INTRAVENOUS SEE ADMIN INSTRUCTIONS
Status: DISCONTINUED | OUTPATIENT
Start: 2020-08-06 | End: 2020-08-06 | Stop reason: HOSPADM

## 2020-08-06 RX ORDER — FENTANYL CITRATE 50 UG/ML
25-50 INJECTION, SOLUTION INTRAMUSCULAR; INTRAVENOUS
Status: CANCELLED | OUTPATIENT
Start: 2020-08-06

## 2020-08-06 RX ORDER — LIDOCAINE HYDROCHLORIDE 10 MG/ML
INJECTION, SOLUTION INFILTRATION; PERINEURAL PRN
Status: DISCONTINUED | OUTPATIENT
Start: 2020-08-06 | End: 2020-08-06 | Stop reason: HOSPADM

## 2020-08-06 RX ORDER — PROPOFOL 10 MG/ML
INJECTION, EMULSION INTRAVENOUS CONTINUOUS PRN
Status: DISCONTINUED | OUTPATIENT
Start: 2020-08-06 | End: 2020-08-06

## 2020-08-06 RX ORDER — HYDROXYZINE PAMOATE 25 MG/1
25 CAPSULE ORAL 3 TIMES DAILY PRN
Qty: 26 CAPSULE | Refills: 0 | Status: SHIPPED | OUTPATIENT
Start: 2020-08-06

## 2020-08-06 RX ORDER — MAGNESIUM SULFATE HEPTAHYDRATE 40 MG/ML
2 INJECTION, SOLUTION INTRAVENOUS ONCE
Status: COMPLETED | OUTPATIENT
Start: 2020-08-06 | End: 2020-08-06

## 2020-08-06 RX ORDER — KETOROLAC TROMETHAMINE 30 MG/ML
30 INJECTION, SOLUTION INTRAMUSCULAR; INTRAVENOUS EVERY 6 HOURS PRN
Status: DISCONTINUED | OUTPATIENT
Start: 2020-08-07 | End: 2020-08-06 | Stop reason: HOSPADM

## 2020-08-06 RX ORDER — FENTANYL CITRATE 50 UG/ML
INJECTION, SOLUTION INTRAMUSCULAR; INTRAVENOUS PRN
Status: DISCONTINUED | OUTPATIENT
Start: 2020-08-06 | End: 2020-08-06

## 2020-08-06 RX ORDER — DEXAMETHASONE SODIUM PHOSPHATE 4 MG/ML
INJECTION, SOLUTION INTRA-ARTICULAR; INTRALESIONAL; INTRAMUSCULAR; INTRAVENOUS; SOFT TISSUE PRN
Status: DISCONTINUED | OUTPATIENT
Start: 2020-08-06 | End: 2020-08-06

## 2020-08-06 RX ORDER — LIDOCAINE 40 MG/G
CREAM TOPICAL
Status: DISCONTINUED | OUTPATIENT
Start: 2020-08-06 | End: 2020-08-06 | Stop reason: HOSPADM

## 2020-08-06 RX ORDER — KETOROLAC TROMETHAMINE 30 MG/ML
INJECTION, SOLUTION INTRAMUSCULAR; INTRAVENOUS PRN
Status: DISCONTINUED | OUTPATIENT
Start: 2020-08-06 | End: 2020-08-06

## 2020-08-06 RX ORDER — ONDANSETRON 4 MG/1
4 TABLET, ORALLY DISINTEGRATING ORAL EVERY 30 MIN PRN
Status: DISCONTINUED | OUTPATIENT
Start: 2020-08-06 | End: 2020-08-06 | Stop reason: HOSPADM

## 2020-08-06 RX ORDER — OXYCODONE AND ACETAMINOPHEN 5; 325 MG/1; MG/1
1 TABLET ORAL
Status: COMPLETED | OUTPATIENT
Start: 2020-08-06 | End: 2020-08-06

## 2020-08-06 RX ORDER — KETOROLAC TROMETHAMINE 30 MG/ML
30 INJECTION, SOLUTION INTRAMUSCULAR; INTRAVENOUS EVERY 6 HOURS PRN
Status: DISCONTINUED | OUTPATIENT
Start: 2020-08-06 | End: 2020-08-06

## 2020-08-06 RX ORDER — FENTANYL CITRATE 50 UG/ML
25-50 INJECTION, SOLUTION INTRAMUSCULAR; INTRAVENOUS
Status: DISCONTINUED | OUTPATIENT
Start: 2020-08-06 | End: 2020-08-06 | Stop reason: HOSPADM

## 2020-08-06 RX ORDER — CELECOXIB 200 MG/1
200 CAPSULE ORAL ONCE
Status: COMPLETED | OUTPATIENT
Start: 2020-08-06 | End: 2020-08-06

## 2020-08-06 RX ORDER — SODIUM CHLORIDE, SODIUM LACTATE, POTASSIUM CHLORIDE, CALCIUM CHLORIDE 600; 310; 30; 20 MG/100ML; MG/100ML; MG/100ML; MG/100ML
INJECTION, SOLUTION INTRAVENOUS CONTINUOUS
Status: DISCONTINUED | OUTPATIENT
Start: 2020-08-06 | End: 2020-08-06 | Stop reason: HOSPADM

## 2020-08-06 RX ORDER — ONDANSETRON 2 MG/ML
4 INJECTION INTRAMUSCULAR; INTRAVENOUS EVERY 30 MIN PRN
Status: DISCONTINUED | OUTPATIENT
Start: 2020-08-06 | End: 2020-08-06 | Stop reason: HOSPADM

## 2020-08-06 RX ORDER — MEPERIDINE HYDROCHLORIDE 25 MG/ML
12.5 INJECTION INTRAMUSCULAR; INTRAVENOUS; SUBCUTANEOUS
Status: DISCONTINUED | OUTPATIENT
Start: 2020-08-06 | End: 2020-08-06 | Stop reason: HOSPADM

## 2020-08-06 RX ORDER — CEFAZOLIN SODIUM 2 G/100ML
2 INJECTION, SOLUTION INTRAVENOUS
Status: COMPLETED | OUTPATIENT
Start: 2020-08-06 | End: 2020-08-06

## 2020-08-06 RX ORDER — OXYCODONE AND ACETAMINOPHEN 5; 325 MG/1; MG/1
1-2 TABLET ORAL EVERY 4 HOURS PRN
Qty: 26 TABLET | Refills: 0 | Status: SHIPPED | OUTPATIENT
Start: 2020-08-06 | End: 2021-06-27

## 2020-08-06 RX ORDER — GABAPENTIN 300 MG/1
300 CAPSULE ORAL ONCE
Status: COMPLETED | OUTPATIENT
Start: 2020-08-06 | End: 2020-08-06

## 2020-08-06 RX ORDER — NALOXONE HYDROCHLORIDE 0.4 MG/ML
.1-.4 INJECTION, SOLUTION INTRAMUSCULAR; INTRAVENOUS; SUBCUTANEOUS
Status: DISCONTINUED | OUTPATIENT
Start: 2020-08-06 | End: 2020-08-06 | Stop reason: HOSPADM

## 2020-08-06 RX ORDER — BUPIVACAINE HYDROCHLORIDE 5 MG/ML
INJECTION, SOLUTION PERINEURAL PRN
Status: DISCONTINUED | OUTPATIENT
Start: 2020-08-06 | End: 2020-08-06 | Stop reason: HOSPADM

## 2020-08-06 RX ORDER — HYDROMORPHONE HYDROCHLORIDE 1 MG/ML
.3-.5 INJECTION, SOLUTION INTRAMUSCULAR; INTRAVENOUS; SUBCUTANEOUS EVERY 10 MIN PRN
Status: DISCONTINUED | OUTPATIENT
Start: 2020-08-06 | End: 2020-08-06 | Stop reason: HOSPADM

## 2020-08-06 RX ORDER — ACETAMINOPHEN 325 MG/1
975 TABLET ORAL ONCE
Status: COMPLETED | OUTPATIENT
Start: 2020-08-06 | End: 2020-08-06

## 2020-08-06 RX ORDER — ONDANSETRON 2 MG/ML
INJECTION INTRAMUSCULAR; INTRAVENOUS PRN
Status: DISCONTINUED | OUTPATIENT
Start: 2020-08-06 | End: 2020-08-06

## 2020-08-06 RX ADMIN — KETOROLAC TROMETHAMINE 30 MG: 30 INJECTION, SOLUTION INTRAMUSCULAR at 16:19

## 2020-08-06 RX ADMIN — SODIUM CHLORIDE, POTASSIUM CHLORIDE, SODIUM LACTATE AND CALCIUM CHLORIDE 1000 ML: 600; 310; 30; 20 INJECTION, SOLUTION INTRAVENOUS at 15:11

## 2020-08-06 RX ADMIN — MAGNESIUM SULFATE IN WATER 2 G: 40 INJECTION, SOLUTION INTRAVENOUS at 16:02

## 2020-08-06 RX ADMIN — MIDAZOLAM HYDROCHLORIDE 5 MG: 1 INJECTION, SOLUTION INTRAMUSCULAR; INTRAVENOUS at 16:09

## 2020-08-06 RX ADMIN — CEFAZOLIN SODIUM 2 G: 2 INJECTION, SOLUTION INTRAVENOUS at 16:07

## 2020-08-06 RX ADMIN — PHENYLEPHRINE HYDROCHLORIDE 100 MCG: 10 INJECTION INTRAVENOUS at 17:07

## 2020-08-06 RX ADMIN — ACETAMINOPHEN 975 MG: 325 TABLET, FILM COATED ORAL at 14:40

## 2020-08-06 RX ADMIN — PHENYLEPHRINE HYDROCHLORIDE 100 MCG: 10 INJECTION INTRAVENOUS at 17:03

## 2020-08-06 RX ADMIN — PROPOFOL 75 MCG/KG/MIN: 10 INJECTION, EMULSION INTRAVENOUS at 16:15

## 2020-08-06 RX ADMIN — FENTANYL CITRATE 100 MCG: 50 INJECTION, SOLUTION INTRAMUSCULAR; INTRAVENOUS at 16:09

## 2020-08-06 RX ADMIN — PHENYLEPHRINE HYDROCHLORIDE 200 MCG: 10 INJECTION INTRAVENOUS at 17:10

## 2020-08-06 RX ADMIN — DEXAMETHASONE SODIUM PHOSPHATE 8 MG: 4 INJECTION, SOLUTION INTRA-ARTICULAR; INTRALESIONAL; INTRAMUSCULAR; INTRAVENOUS; SOFT TISSUE at 16:15

## 2020-08-06 RX ADMIN — OXYCODONE HYDROCHLORIDE AND ACETAMINOPHEN 1 TABLET: 5; 325 TABLET ORAL at 18:21

## 2020-08-06 RX ADMIN — GABAPENTIN 300 MG: 300 CAPSULE ORAL at 14:40

## 2020-08-06 RX ADMIN — ONDANSETRON 4 MG: 2 INJECTION INTRAMUSCULAR; INTRAVENOUS at 16:15

## 2020-08-06 RX ADMIN — CELECOXIB 200 MG: 200 CAPSULE ORAL at 14:40

## 2020-08-06 ASSESSMENT — MIFFLIN-ST. JEOR: SCORE: 1303.9

## 2020-08-06 NOTE — ANESTHESIA POSTPROCEDURE EVALUATION
Patient: Zuleyka Lou    Procedure(s):  Hallux Interphalangeal Joint Resection Arthroplasty,Extensor Tendon Lengthening,Flexor Tendon Evaluation, 4th to resection arthroplasty  1st Metatarsophalangeal Joint and Tarsometatarsal Hardware Removal    Diagnosis:Hallux valgus of right foot [M20.11]  Diagnosis Additional Information: No value filed.    Anesthesia Type:  MAC    Note:  Anesthesia Post Evaluation    Patient location during evaluation: Phase 2  Patient participation: Able to fully participate in evaluation  Level of consciousness: awake  Pain management: adequate  Airway patency: patent  Cardiovascular status: acceptable and hemodynamically stable  Respiratory status: acceptable, room air and spontaneous ventilation  Hydration status: acceptable  PONV: none     Anesthetic complications: None          Last vitals:  Vitals:    08/06/20 1427 08/06/20 1722 08/06/20 1727   BP: (!) 140/98 107/75 107/75   Pulse:   74   Resp: 18 16    Temp: 37  C (98.6  F)  36.9  C (98.5  F)   SpO2: 98% 92% 92%         Electronically Signed By: WILMAR Lopes CRNA  August 6, 2020  5:30 PM

## 2020-08-06 NOTE — DISCHARGE INSTRUCTIONS
Same Day Surgery Discharge Instructions  Special Precautions After Surgery - Adult    1. It is not unusual to feel lightheaded or faint, up to 24 hours after surgery or while taking pain medication.  If you have these symptoms; sit for a few minutes before standing and have someone assist you when getting up.  2. You should rest and relax for the next 24 hours and must have someone stay with you for at least 24 hours after your discharge.  3. DO NOT DRIVE any vehicle or operate mechanical equipment for 24 hours following the end of your surgery.  DO NOT DRIVE while taking narcotic pain medications that have been prescribed by your physician.  If you had a limb operated on, you must be able to use it fully to drive.  4. DO NOT drink alcoholic beverages for 24 hours following surgery or while taking prescription pain medication.  5. Drink clear liquids (apple juice, ginger ale, broth, 7-Up, etc.).  Progress to your regular diet as you feel able.  6. Any questions call your physician and do not make important decisions for 24 hours.    ACTIVITY  ? Rest today.  No activity or diet restrictions.  ? Resume activity as tolerated.  ? Restrictions: per Dr Webber  ? See printed discharge sheet.     INCISIONAL CARE  ? Do not remove dressing until seen by physician.  ? Keep extremity elevated above the level of the heart if possible. .  ? Apply ice 1/2 hour on and 1/2 hour off while awake.  ? Be alert for signs of infection:  redness, swelling, heat, drainage of pus, and/or elevated temperature.  Contact your doctor if these occur.     ? Wear post op shoe to protect your foot     Call for an appointment to return to the clinic as directed    Medications:  ? Acetaminophen & Oxycodone (Percocet):  Last dose: 1 tablet at 6:15 pm.  ? Hydroxyzine (Vistaril):  Next dose:AS NEEDED.  ? Ibuprofen (Motrin, Advil):  Next dose: start tomorrow in the morning.  ? Stool softeners to prevent constipation  ? Follow the  instructions on the bottle.  __________________________________________________________________________________________________________________________________  IMPORTANT NUMBERS:    [facility] [place]:  [phone], [phone]  Pharmacy:  [phone]  Same Day Surgery:  [phone], Monday - Friday until 8:30 p.m.  Urgent Care:  [phone]  Emergency Room:  [phone]      [facility]:  [phone]

## 2020-08-06 NOTE — ANESTHESIA CARE TRANSFER NOTE
Patient: Zuleyka Lou    Procedure(s):  Hallux Interphalangeal Joint Resection Arthroplasty,Extensor Tendon Lengthening,Flexor Tendon Evaluation, 4th to resection arthroplasty  1st Metatarsophalangeal Joint and Tarsometatarsal Hardware Removal    Diagnosis: Hallux valgus of right foot [M20.11]  Diagnosis Additional Information: No value filed.    Anesthesia Type:   MAC     Note:  Airway :Room Air  Patient transferred to:Phase II  Handoff Report: Identifed the Patient, Identified the Reponsible Provider, Reviewed the pertinent medical history, Discussed the surgical course, Reviewed Intra-OP anesthesia mangement and issues during anesthesia, Set expectations for post-procedure period and Allowed opportunity for questions and acknowledgement of understanding      Vitals: (Last set prior to Anesthesia Care Transfer)    CRNA VITALS  8/6/2020 1650 - 8/6/2020 1722      8/6/2020             Pulse:  72    SpO2:  98 %                Electronically Signed By: WILMAR Lopes CRNA  August 6, 2020  5:22 PM

## 2020-08-06 NOTE — ANESTHESIA PREPROCEDURE EVALUATION
Anesthesia Pre-Procedure Evaluation    Patient: Zuleyka Lou   MRN: [id number] : 1960          Preoperative Diagnosis: Hallux valgus of right foot [M20.11]    Procedure(s):  Hallux Interphalangeal Joint Resection Arthroplasty,Extensor Tendon Lengthening,Flexor Tendon Evaluationl  1st Metatarsophalangeal Joint and Tarsometatarsal Hardware Removal    Past Medical History:   Diagnosis Date     Hypertension      Past Surgical History:   Procedure Laterality Date     APPENDECTOMY       ARTHRODESIS TOE(S) Left 2017    Procedure: ARTHRODESIS TOE(S);  Surgeon: Ant Webber DPM;  Location: [facility] OR     ARTHROPLASTY KNEE Right 2016    Procedure: ARTHROPLASTY KNEE;  Surgeon: Yo Pereyra MD;  Location: [facility] OR     CARPAL TUNNEL RELEASE RT/LT       GASTRIC BYPASS      Autumn en Y     HYSTERECTOMY      hysterctomy only     OPEN REDUCTION INTERNAL FIXATION TIBIA Right 2015    Procedure: OPEN REDUCTION INTERNAL FIXATION TIBIA;  Surgeon: Yo Pereyra MD;  Location: [facility] OR     REMOVE HARDWARE KNEE Right 2016    Procedure: REMOVE HARDWARE KNEE;  Surgeon: Yo Pereyra MD;  Location: [facility] OR     TONSILLECTOMY & ADENOIDECTOMY         Anesthesia Evaluation     . Pt has had prior anesthetic. Type: General and MAC    No history of anesthetic complications          ROS/MED HX    ENT/Pulmonary:       Neurologic:  - neg neurologic ROS     Cardiovascular:     (+) Dyslipidemia, hypertension----. : . . . :. .       METS/Exercise Tolerance:  >4 METS   Hematologic:  - neg hematologic  ROS       Musculoskeletal:  - neg musculoskeletal ROS       GI/Hepatic:  - neg GI/hepatic ROS       Renal/Genitourinary:  - ROS Renal section negative       Endo:     (+) thyroid problem hypothyroidism, .      Psychiatric:     (+) psychiatric history depression and anxiety      Infectious Disease:  - neg infectious disease ROS       Malignancy:      - no malignancy   Other:    - neg other ROS  "                     Physical Exam  Normal systems: cardiovascular, pulmonary and dental    Airway   Mallampati: II  TM distance: >3 FB  Neck ROM: full    Dental     Cardiovascular       Pulmonary             Lab Results   Component Value Date    WBC 8.0 06/04/2015    HGB 9.7 (L) 01/30/2016    HCT 37.3 06/04/2015     06/11/2015    SED 14 08/30/2010     01/29/2016    POTASSIUM 3.9 01/29/2016    CHLORIDE 107 01/29/2016    CO2 28 01/29/2016    BUN 14 01/29/2016    CR 0.49 (L) 01/29/2016     (H) 01/30/2016    SENTHIL 8.3 (L) 01/29/2016    ALBUMIN 3.8 (L) 05/12/2014    PROTTOTAL 6.9 05/12/2014    ALT 26 05/12/2014    AST 31 05/12/2014    ALKPHOS 57 05/12/2014    BILITOTAL 0.4 05/12/2014    LIPASE 150 07/06/2008    PTT 32 05/12/2014    INR 0.91 05/12/2014    .65 (H) 06/04/2015    T4 0.21 (L) 06/04/2015       Preop Vitals  BP Readings from Last 3 Encounters:   08/06/20 (!) 140/98   01/26/17 113/81   01/31/16 136/73    Pulse Readings from Last 3 Encounters:   01/26/17 77   01/31/16 64   06/11/15 81      Resp Readings from Last 3 Encounters:   08/06/20 18   01/26/17 16   01/31/16 18    SpO2 Readings from Last 3 Encounters:   08/06/20 98%   01/26/17 94%   01/31/16 96%      Temp Readings from Last 1 Encounters:   08/06/20 37  C (98.6  F) (Oral)    Ht Readings from Last 1 Encounters:   08/06/20 1.575 m (5' 2\")      Wt Readings from Last 1 Encounters:   08/06/20 77.6 kg (171 lb)    Estimated body mass index is 31.28 kg/m  as calculated from the following:    Height as of this encounter: 1.575 m (5' 2\").    Weight as of this encounter: 77.6 kg (171 lb).       Anesthesia Plan      History & Physical Review  History and physical reviewed and following examination; no interval change.    ASA Status:  2 .    NPO Status:  > 8 hours    Plan for MAC Reason for MAC:  Deep or markedly invasive procedure (G8)  PONV prophylaxis:  Ondansetron (or other 5HT-3) and Dexamethasone or Solumedrol         Postoperative " Care  Postoperative pain management:  IV analgesics and Oral pain medications.      Consents  Anesthetic plan, risks, benefits and alternatives discussed with:  Patient..                 WILMAR Dunlap CRNA

## 2021-05-29 ENCOUNTER — RECORDS - HEALTHEAST (OUTPATIENT)
Dept: ADMINISTRATIVE | Facility: CLINIC | Age: 61
End: 2021-05-29

## 2021-06-27 ENCOUNTER — APPOINTMENT (OUTPATIENT)
Dept: CT IMAGING | Facility: CLINIC | Age: 61
End: 2021-06-27
Attending: FAMILY MEDICINE
Payer: COMMERCIAL

## 2021-06-27 ENCOUNTER — HOSPITAL ENCOUNTER (EMERGENCY)
Facility: CLINIC | Age: 61
Discharge: HOME | End: 2021-06-27
Attending: FAMILY MEDICINE | Admitting: FAMILY MEDICINE
Payer: COMMERCIAL

## 2021-06-27 VITALS
BODY MASS INDEX: 30.54 KG/M2 | RESPIRATION RATE: 18 BRPM | HEART RATE: 64 BPM | TEMPERATURE: 97.6 F | WEIGHT: 167 LBS | OXYGEN SATURATION: 100 % | SYSTOLIC BLOOD PRESSURE: 137 MMHG | DIASTOLIC BLOOD PRESSURE: 87 MMHG

## 2021-06-27 DIAGNOSIS — Z98.84 HISTORY OF GASTRIC BYPASS: ICD-10-CM

## 2021-06-27 DIAGNOSIS — K43.9 VENTRAL HERNIA WITHOUT OBSTRUCTION OR GANGRENE: ICD-10-CM

## 2021-06-27 LAB
ALBUMIN SERPL-MCNC: 3.7 G/DL (ref 3.4–5)
ALP SERPL-CCNC: 83 U/L (ref 40–150)
ALT SERPL W P-5'-P-CCNC: 56 U/L (ref 0–50)
ANION GAP SERPL CALCULATED.3IONS-SCNC: 5 MMOL/L (ref 3–14)
AST SERPL W P-5'-P-CCNC: 46 U/L (ref 0–45)
BASOPHILS # BLD AUTO: 0.1 10E9/L (ref 0–0.2)
BASOPHILS NFR BLD AUTO: 0.9 %
BILIRUB SERPL-MCNC: 0.5 MG/DL (ref 0.2–1.3)
BUN SERPL-MCNC: 11 MG/DL (ref 7–30)
CALCIUM SERPL-MCNC: 9.4 MG/DL (ref 8.5–10.1)
CHLORIDE SERPL-SCNC: 108 MMOL/L (ref 94–109)
CO2 SERPL-SCNC: 28 MMOL/L (ref 20–32)
CREAT SERPL-MCNC: 0.82 MG/DL (ref 0.52–1.04)
DIFFERENTIAL METHOD BLD: NORMAL
EOSINOPHIL # BLD AUTO: 0.3 10E9/L (ref 0–0.7)
EOSINOPHIL NFR BLD AUTO: 4.4 %
ERYTHROCYTE [DISTWIDTH] IN BLOOD BY AUTOMATED COUNT: 13.8 % (ref 10–15)
GFR SERPL CREATININE-BSD FRML MDRD: 78 ML/MIN/{1.73_M2}
GLUCOSE SERPL-MCNC: 89 MG/DL (ref 70–99)
HCT VFR BLD AUTO: 40.3 % (ref 35–47)
HGB BLD-MCNC: 13.1 G/DL (ref 11.7–15.7)
IMM GRANULOCYTES # BLD: 0 10E9/L (ref 0–0.4)
IMM GRANULOCYTES NFR BLD: 0.4 %
LYMPHOCYTES # BLD AUTO: 1.7 10E9/L (ref 0.8–5.3)
LYMPHOCYTES NFR BLD AUTO: 25.8 %
MCH RBC QN AUTO: 29.8 PG (ref 26.5–33)
MCHC RBC AUTO-ENTMCNC: 32.5 G/DL (ref 31.5–36.5)
MCV RBC AUTO: 92 FL (ref 78–100)
MONOCYTES # BLD AUTO: 0.7 10E9/L (ref 0–1.3)
MONOCYTES NFR BLD AUTO: 11 %
NEUTROPHILS # BLD AUTO: 3.9 10E9/L (ref 1.6–8.3)
NEUTROPHILS NFR BLD AUTO: 57.5 %
NRBC # BLD AUTO: 0 10*3/UL
NRBC BLD AUTO-RTO: 0 /100
PLATELET # BLD AUTO: 300 10E9/L (ref 150–450)
POTASSIUM SERPL-SCNC: 4.2 MMOL/L (ref 3.4–5.3)
PROT SERPL-MCNC: 7.7 G/DL (ref 6.8–8.8)
RBC # BLD AUTO: 4.4 10E12/L (ref 3.8–5.2)
SODIUM SERPL-SCNC: 141 MMOL/L (ref 133–144)
WBC # BLD AUTO: 6.8 10E9/L (ref 4–11)

## 2021-06-27 PROCEDURE — 250N000011 HC RX IP 250 OP 636: Performed by: FAMILY MEDICINE

## 2021-06-27 PROCEDURE — 80053 COMPREHEN METABOLIC PANEL: CPT | Performed by: FAMILY MEDICINE

## 2021-06-27 PROCEDURE — 99284 EMERGENCY DEPT VISIT MOD MDM: CPT | Performed by: FAMILY MEDICINE

## 2021-06-27 PROCEDURE — 74177 CT ABD & PELVIS W/CONTRAST: CPT

## 2021-06-27 PROCEDURE — 85025 COMPLETE CBC W/AUTO DIFF WBC: CPT | Performed by: FAMILY MEDICINE

## 2021-06-27 PROCEDURE — 99285 EMERGENCY DEPT VISIT HI MDM: CPT | Mod: 25 | Performed by: FAMILY MEDICINE

## 2021-06-27 PROCEDURE — 250N000009 HC RX 250: Performed by: FAMILY MEDICINE

## 2021-06-27 RX ORDER — LEVOTHYROXINE SODIUM 125 UG/1
1 TABLET ORAL DAILY
COMMUNITY
Start: 2021-06-25

## 2021-06-27 RX ORDER — TRAZODONE HYDROCHLORIDE 100 MG/1
.5-1 TABLET ORAL AT BEDTIME
COMMUNITY
Start: 2021-06-24

## 2021-06-27 RX ORDER — AMLODIPINE BESYLATE 10 MG/1
1 TABLET ORAL DAILY
COMMUNITY
Start: 2021-05-28

## 2021-06-27 RX ORDER — IOPAMIDOL 755 MG/ML
82 INJECTION, SOLUTION INTRAVASCULAR ONCE
Status: COMPLETED | OUTPATIENT
Start: 2021-06-27 | End: 2021-06-27

## 2021-06-27 RX ADMIN — IOPAMIDOL 82 ML: 755 INJECTION, SOLUTION INTRAVENOUS at 14:39

## 2021-06-27 RX ADMIN — SODIUM CHLORIDE 61 ML: 9 INJECTION, SOLUTION INTRAVENOUS at 14:40

## 2021-06-27 ASSESSMENT — ENCOUNTER SYMPTOMS
DIZZINESS: 0
ENDOCRINE NEGATIVE: 1
MUSCULOSKELETAL NEGATIVE: 1
EYES NEGATIVE: 1
COUGH: 0
FEVER: 0
CHILLS: 0
PSYCHIATRIC NEGATIVE: 1
BRUISES/BLEEDS EASILY: 0
VOMITING: 1
ABDOMINAL PAIN: 1
ABDOMINAL DISTENTION: 1
WEAKNESS: 0
SHORTNESS OF BREATH: 0

## 2021-06-27 NOTE — DISCHARGE INSTRUCTIONS
Diet and activity as tolerates.    Okay to use Tylenol or ibuprofen for pain.    Call for specialty appointment.  I did put in a referral to general surgery.  Number is [phone].    If for some reason you develop severe pain, nausea and vomiting or other concerning symptoms, return to the emergency room.

## 2021-06-27 NOTE — ED PROVIDER NOTES
History     Chief Complaint   Patient presents with     Abdominal Pain     RUQ pain started 5 days ago. hx of gastric bypass 30 yrs ago. Scheduled for CT on 6th to check for a hernia.      HPI  Zuleyka Lou is a 61 year old female who presents with a painful bulge in the left upper abdomen.      Zuleyka, age 61, presents with a bulging presents in right upper quadrant.  She did have a fairly large incision up in this area 30 years ago when she had a Autumn-en-Y gastric bypass procedure.  This bulge has become more noticeable in the last month.  It has become more painful in the last 4 or 5 days.  She has had 1 or 2 episodes of vomiting when this pain occurs.  She has not had hematemesis.    Patient is otherwise quite healthy.    She takes some medication for hypertension.    She has tolerated surgery well in the past.        Allergies:  Allergies   Allergen Reactions     Bees Swelling and Difficulty breathing     Lunesta Other (See Comments)     lethargic     Trazodone Other (See Comments)     Lethargic     Eszopiclone Unknown and Rash     lethargic  sleep eating with Ambien       Problem List:    Patient Active Problem List    Diagnosis Date Noted     S/P total knee arthroplasty 01/28/2016     Priority: Medium     Major depressive disorder, recurrent episode, moderate (H) 09/24/2015     Priority: Medium     Closed fracture of lateral portion of right tibial plateau 06/08/2015     Priority: Medium     HTN (hypertension) 06/04/2015     Priority: Medium     Acquired hypothyroidism 06/04/2015     Priority: Medium     Adjustment disorder with mixed anxiety and depressed mood 01/16/2012     Priority: Medium     Headache 08/30/2010     Priority: Medium     Problem list name updated by automated process. Provider to review          Past Medical History:    Past Medical History:   Diagnosis Date     Hypertension        Past Surgical History:    Past Surgical History:   Procedure Laterality Date     APPENDECTOMY        ARTHRODESIS TOE(S) Left 1/26/2017    Procedure: ARTHRODESIS TOE(S);  Surgeon: Ant Webber DPM;  Location: [facility]     ARTHROPLASTY KNEE Right 1/28/2016    Procedure: ARTHROPLASTY KNEE;  Surgeon: Yo Pereyra MD;  Location: [facility]     BUNIONECTOMY Left 8/6/2020    Procedure: Hallux Interphalangeal Joint Resection Arthroplasty,Extensor Tendon Lengthening,Flexor Tendon Evaluation, 4th to resection arthroplasty;  Surgeon: Ant Webber DPM;  Location: [facility]     CARPAL TUNNEL RELEASE RT/LT       GASTRIC BYPASS  2005    Autumn en Y     HYSTERECTOMY  1990    hysterctomy only     OPEN REDUCTION INTERNAL FIXATION TIBIA Right 6/8/2015    Procedure: OPEN REDUCTION INTERNAL FIXATION TIBIA;  Surgeon: Yo Pereyra MD;  Location: [facility]     REMOVE HARDWARE FOOT Left 8/6/2020    Procedure: 1st Metatarsophalangeal Joint and Tarsometatarsal Hardware Removal;  Surgeon: Ant Webber DPM;  Location: [facility]     REMOVE HARDWARE KNEE Right 1/28/2016    Procedure: REMOVE HARDWARE KNEE;  Surgeon: Yo Pereyra MD;  Location: [facility]     TONSILLECTOMY & ADENOIDECTOMY         Family History:    Family History   Problem Relation Age of Onset     Heart Disease Mother      Thyroid Disease Mother      Hypertension Mother      Alcohol/Drug Mother      Genitourinary Problems Father      Respiratory Father      Alcohol/Drug Father      Cerebrovascular Disease Maternal Grandmother      Depression Daughter      Depression Daughter        Social History:  Marital Status:  Single [1]  Social History     Tobacco Use     Smoking status: Never Smoker     Smokeless tobacco: Never Used   Substance Use Topics     Alcohol use: Yes     Comment: 0-1drink/week     Drug use: No        Medications:    amLODIPine (NORVASC) 10 MG tablet  aspirin  MG EC tablet  Cyanocobalamin (VITAMIN B-12 SL)  FLUoxetine (PROZAC) 20 MG capsule  hydrOXYzine (VISTARIL) 25 MG capsule  levothyroxine (SYNTHROID/LEVOTHROID) 125 MCG  tablet  Multiple Vitamins-Iron (MULTIVITAMIN/IRON PO)  Nutritional Supplements (MELATONIN PO)  order for DME  order for DME  oxyCODONE-acetaminophen (PERCOCET) 5-325 MG tablet  traZODone (DESYREL) 100 MG tablet  UNABLE TO FIND          Review of Systems   Constitutional: Negative for chills and fever.   HENT: Negative.    Eyes: Negative.    Respiratory: Negative for cough and shortness of breath.    Cardiovascular: Negative for chest pain.   Gastrointestinal: Positive for abdominal distention, abdominal pain and vomiting.   Endocrine: Negative.    Genitourinary: Negative.    Musculoskeletal: Negative.    Skin: Negative for rash.   Neurological: Negative for dizziness and weakness.   Hematological: Does not bruise/bleed easily.   Psychiatric/Behavioral: Negative.        Physical Exam   BP: 127/88  Pulse: 77  Temp: 97.6  F (36.4  C)  Resp: 18  Weight: 75.8 kg (167 lb)  SpO2: 97 %      Physical Exam  HENT:      Head: Normocephalic.   Eyes:      General: No scleral icterus.  Cardiovascular:      Rate and Rhythm: Normal rate and regular rhythm.      Heart sounds: No murmur.   Pulmonary:      Breath sounds: Normal breath sounds.   Abdominal:      General: Bowel sounds are normal.      Hernia: A hernia is present. Hernia is present in the ventral area.      Comments: On inspection, protruding large ventral hernia beneath her vertical incision.  Size is about 14 cm diameter.  Reduces spontaneously.   Skin:     Findings: No rash.   Neurological:      General: No focal deficit present.      Mental Status: She is alert.   Psychiatric:         Mood and Affect: Mood normal.         ED Course     13:45 -patient was seen for initial history and exam.  Diagnostic studies ordered.         Procedures               Critical Care time:  none               Results for orders placed or performed during the hospital encounter of 06/27/21 (from the past 24 hour(s))   Comprehensive metabolic panel   Result Value Ref Range    Sodium 141 133  - 144 mmol/L    Potassium 4.2 3.4 - 5.3 mmol/L    Chloride 108 94 - 109 mmol/L    Carbon Dioxide 28 20 - 32 mmol/L    Anion Gap 5 3 - 14 mmol/L    Glucose 89 70 - 99 mg/dL    Urea Nitrogen 11 7 - 30 mg/dL    Creatinine 0.82 0.52 - 1.04 mg/dL    GFR Estimate 78 >60 mL/min/[1.73_m2]    GFR Estimate If Black >90 >60 mL/min/[1.73_m2]    Calcium 9.4 8.5 - 10.1 mg/dL    Bilirubin Total 0.5 0.2 - 1.3 mg/dL    Albumin 3.7 3.4 - 5.0 g/dL    Protein Total 7.7 6.8 - 8.8 g/dL    Alkaline Phosphatase 83 40 - 150 U/L    ALT 56 (H) 0 - 50 U/L    AST 46 (H) 0 - 45 U/L   CBC with platelets differential   Result Value Ref Range    WBC 6.8 4.0 - 11.0 10e9/L    RBC Count 4.40 3.8 - 5.2 10e12/L    Hemoglobin 13.1 11.7 - 15.7 g/dL    Hematocrit 40.3 35.0 - 47.0 %    MCV 92 78 - 100 fl    MCH 29.8 26.5 - 33.0 pg    MCHC 32.5 31.5 - 36.5 g/dL    RDW 13.8 10.0 - 15.0 %    Platelet Count 300 150 - 450 10e9/L    Diff Method Automated Method     % Neutrophils 57.5 %    % Lymphocytes 25.8 %    % Monocytes 11.0 %    % Eosinophils 4.4 %    % Basophils 0.9 %    % Immature Granulocytes 0.4 %    Nucleated RBCs 0 0 /100    Absolute Neutrophil 3.9 1.6 - 8.3 10e9/L    Absolute Lymphocytes 1.7 0.8 - 5.3 10e9/L    Absolute Monocytes 0.7 0.0 - 1.3 10e9/L    Absolute Eosinophils 0.3 0.0 - 0.7 10e9/L    Absolute Basophils 0.1 0.0 - 0.2 10e9/L    Abs Immature Granulocytes 0.0 0 - 0.4 10e9/L    Absolute Nucleated RBC 0.0    CT Abdomen Pelvis w Contrast    Narrative    CT ABDOMEN PELVIS WITH CONTRAST   6/27/2021 2:42 PM     HISTORY: Abdominal pain, hernia suspected.    TECHNIQUE:  CT abdomen and pelvis with 82mL Isovue-370 IV. Radiation  dose for this scan was reduced using automated exposure control,  adjustment of the mA and/or kV according to patient size, or iterative  reconstruction technique.    COMPARISON: None available    FINDINGS:  Lower chest: Mild bibasilar pulmonary opacities, likely atelectasis.  Small hiatal  hernia.    Abdomen/pelvis:    Hepatobiliary: No suspicious focal hepatic lesion. The gallbladder is  unremarkable.    Pancreas: No main pancreatic ductal dilatation or definite solid  pancreatic mass.    Spleen: No splenomegaly.    Adrenal glands: No adrenal nodules.    Kidneys: No radiodense kidney stones or hydronephrosis in either  kidney. Significant cortical scarring of the right kidney.    Bowel: No abnormally dilated bowel loops. Postsurgical changes of  gastric bypass.    Peritoneum: No significant free fluid in the abdomen or pelvis. No  free peritoneal or portal venous gas.    Pelvic organs: The uterus is not visualized, likely surgically absent.    Vascular: Scattered atherosclerotic vascular calcification of the  abdominal aorta and iliac vessels.    Lymph nodes: No significant abdominopelvic lymphadenopathy.    Bones and soft tissue: Moderate-sized ventral hernia containing fat  and nondilated transverse colon loop. Mild degenerative changes of the  spine. No suspicious osseous lesion.      Impression    IMPRESSION:   1. Moderate-sized ventral hernia containing fat and nondilated  transverse colon loop.  2. Small hiatal hernia.    RICO PUGA MD       Medications   iopamidol (ISOVUE-370) solution 82 mL (82 mLs Intravenous Given 6/27/21 1439)   sodium chloride 0.9 % bag 500mL for CT scan flush use (61 mLs Intravenous Given 6/27/21 1440)       Assessments & Plan (with Medical Decision Making)       This patient presented with a ventral hernia on initial exam.  See above evaluation.  She is becoming more symptomatic with abdominal pain.    Work-up included the CT scan as noted above which contains some transverse colon as well as fat.    Patient's CBC and comprehensive profile were satisfactory.    Patient was advised of findings.  I recommended that she have a consultation with a general surgeon and referral was placed.  She is agreeable with this plan.  Indications for return were also  discussed.        I have reviewed the nursing notes.    I have reviewed the findings, diagnosis, plan and need for follow up with the patient.       New Prescriptions    No medications on file       Final diagnoses:   Ventral hernia without obstruction or gangrene   History of gastric bypass       6/27/2021   [facility] EMERGENCY DEPT     Ed Gerard MD  06/27/21 [id number]

## 2021-06-27 NOTE — ED TRIAGE NOTES
RUQ pain started 5 days ago. hx of gastric bypass 30 yrs ago. Scheduled for CT on 6th to check for a hernia.

## 2025-02-17 ENCOUNTER — TRANSCRIBE ORDERS (OUTPATIENT)
Dept: OTHER | Age: 65
End: 2025-02-17

## 2025-02-17 DIAGNOSIS — R63.4 UNEXPLAINED WEIGHT LOSS: ICD-10-CM

## 2025-02-17 DIAGNOSIS — R93.3 ABNORMAL CT SCAN, ESOPHAGUS: ICD-10-CM

## 2025-02-17 DIAGNOSIS — K74.60 DIFFUSE NODULAR CIRRHOSIS OF LIVER (H): Primary | ICD-10-CM

## 2025-02-17 DIAGNOSIS — R79.89 ELEVATED LFTS: ICD-10-CM

## (undated) DEVICE — GUIDE WIRE ARTHREX W/TROCAR TIP .062" AR-8941K

## (undated) DEVICE — PREP CHLORAPREP 26ML TINTED ORANGE  260815

## (undated) DEVICE — GLOVE PROTEXIS POWDER FREE 7.5 ORTHOPEDIC 2D73ET75

## (undated) DEVICE — GLOVE PROTEXIS BLUE W/NEU-THERA 7.5  2D73EB75

## (undated) DEVICE — DECANTER VIAL 2006S

## (undated) DEVICE — GLOVE PROTEXIS W/NEU-THERA 7.5  2D73TE75

## (undated) DEVICE — PACK EXTREMITY LATEX FREE SOP32HFFCS

## (undated) DEVICE — GUIDEWIRE THREADED TROCAR TIP 1.35MM AR-8737-02

## (undated) DEVICE — SU VICRYL 3-0 FS-1 27" J442H

## (undated) DEVICE — BNDG ELASTIC 4"X5YDS UNSTERILE 6611-40

## (undated) DEVICE — BLADE KNIFE SURG 15 371115

## (undated) DEVICE — DRAPE STERI TOWEL SM 1000

## (undated) DEVICE — DRSG GAUZE 4X4" TRAY

## (undated) DEVICE — GLOVE PROTEXIS W/NEU-THERA 6.5  2D73TE65

## (undated) DEVICE — GOWN LG DISP 9515

## (undated) DEVICE — SU ETHILON 3-0 FS-1 18" 669H

## (undated) DEVICE — SU VICRYL 2-0 SH 27" UND J417H

## (undated) DEVICE — SOL NACL 0.9% IRRIG 1000ML BOTTLE 07138-09

## (undated) DEVICE — REAMER ARTHREX METATARSAL 20MM AR-8944MR-20

## (undated) DEVICE — GLOVE PROTEXIS W/NEU-THERA 8.0  2D73TE80

## (undated) DEVICE — BNDG ELASTIC 6" DBL LENGTH UNSTERILE 6611-16

## (undated) DEVICE — GOWN XLG DISP 9545

## (undated) DEVICE — DRAPE EXTREMITY W/ARMBOARD 29405

## (undated) DEVICE — NDL 22GA 1.5"

## (undated) DEVICE — SYR 10ML FINGER CONTROL W/O NDL 309695

## (undated) DEVICE — DRILL BIT ARTHREX CAN 2.5MM AR-8737-09

## (undated) DEVICE — DRILL BIT ARTHREX LPS CAN 2.0MM AR-8933-20C

## (undated) DEVICE — GLOVE PROTEXIS POWDER FREE 8.0 ORTHOPEDIC 2D73ET80

## (undated) DEVICE — BLADE SAW OSCIL/SAG STRK MICRO 5.5X18X0.38MM 2296-003-412

## (undated) DEVICE — DRAPE C-ARM MINI 110788

## (undated) DEVICE — CAST PADDING 4" STERILE 9044S

## (undated) DEVICE — DRILL BIT ARTHREX MTP 2.0MM AR-8944-22

## (undated) DEVICE — DRAPE SHEET REV FOLD 3/4 9349

## (undated) DEVICE — DRSG ADAPTIC 3X8" 6113

## (undated) DEVICE — BLADE SAW OSCIL/SAG STRK MICRO 9.0X18.5X0.38MM 2296-003-105

## (undated) DEVICE — DRSG ABDOMINAL 07 1/2X8" 7197D

## (undated) DEVICE — DRILL BIT ARTHREX 2.5MM AR-8943-42

## (undated) DEVICE — GUIDEWIRE THRD TROCAR TIP .045" W/LASER LINE AR-8737-05

## (undated) DEVICE — DRAPE C-ARM MINI 5423

## (undated) DEVICE — REAMER ARTHREX PHALANGEAL 20MM AR-8944PR-20

## (undated) DEVICE — GLOVE PROTEXIS BLUE W/NEU-THERA 7.0  2D73EB70

## (undated) DEVICE — GUIDE WIRE ARTHREX THRD W/TROCAR TIP .062" AR-8941KT

## (undated) RX ORDER — BUPIVACAINE HYDROCHLORIDE 5 MG/ML
INJECTION, SOLUTION PERINEURAL
Status: DISPENSED
Start: 2017-01-26

## (undated) RX ORDER — LIDOCAINE HYDROCHLORIDE 10 MG/ML
INJECTION, SOLUTION EPIDURAL; INFILTRATION; INTRACAUDAL; PERINEURAL
Status: DISPENSED
Start: 2020-08-06

## (undated) RX ORDER — FENTANYL CITRATE 50 UG/ML
INJECTION, SOLUTION INTRAMUSCULAR; INTRAVENOUS
Status: DISPENSED
Start: 2020-08-06

## (undated) RX ORDER — FENTANYL CITRATE 50 UG/ML
INJECTION, SOLUTION INTRAMUSCULAR; INTRAVENOUS
Status: DISPENSED
Start: 2017-01-26

## (undated) RX ORDER — ACETAMINOPHEN 325 MG/1
TABLET ORAL
Status: DISPENSED
Start: 2020-08-06

## (undated) RX ORDER — OXYCODONE AND ACETAMINOPHEN 5; 325 MG/1; MG/1
TABLET ORAL
Status: DISPENSED
Start: 2020-08-06

## (undated) RX ORDER — CEFAZOLIN SODIUM 2 G/100ML
INJECTION, SOLUTION INTRAVENOUS
Status: DISPENSED
Start: 2020-08-06

## (undated) RX ORDER — DEXAMETHASONE SODIUM PHOSPHATE 4 MG/ML
INJECTION, SOLUTION INTRA-ARTICULAR; INTRALESIONAL; INTRAMUSCULAR; INTRAVENOUS; SOFT TISSUE
Status: DISPENSED
Start: 2020-08-06

## (undated) RX ORDER — PROPOFOL 10 MG/ML
INJECTION, EMULSION INTRAVENOUS
Status: DISPENSED
Start: 2020-08-06

## (undated) RX ORDER — MAGNESIUM SULFATE HEPTAHYDRATE 40 MG/ML
INJECTION, SOLUTION INTRAVENOUS
Status: DISPENSED
Start: 2020-08-06

## (undated) RX ORDER — LIDOCAINE HCL/EPINEPHRINE/PF 2%-1:200K
VIAL (ML) INJECTION
Status: DISPENSED
Start: 2017-01-26

## (undated) RX ORDER — CELECOXIB 200 MG/1
CAPSULE ORAL
Status: DISPENSED
Start: 2020-08-06

## (undated) RX ORDER — ROPIVACAINE HYDROCHLORIDE 5 MG/ML
INJECTION, SOLUTION EPIDURAL; INFILTRATION; PERINEURAL
Status: DISPENSED
Start: 2017-01-26

## (undated) RX ORDER — ONDANSETRON 2 MG/ML
INJECTION INTRAMUSCULAR; INTRAVENOUS
Status: DISPENSED
Start: 2020-08-06

## (undated) RX ORDER — BUPIVACAINE HYDROCHLORIDE 5 MG/ML
INJECTION, SOLUTION PERINEURAL
Status: DISPENSED
Start: 2020-08-06

## (undated) RX ORDER — PROPOFOL 10 MG/ML
INJECTION, EMULSION INTRAVENOUS
Status: DISPENSED
Start: 2017-01-26

## (undated) RX ORDER — GABAPENTIN 300 MG/1
CAPSULE ORAL
Status: DISPENSED
Start: 2020-08-06

## (undated) RX ORDER — FENTANYL CITRATE-0.9 % NACL/PF 10 MCG/ML
PLASTIC BAG, INJECTION (ML) INTRAVENOUS
Status: DISPENSED
Start: 2020-08-06

## (undated) RX ORDER — LIDOCAINE HYDROCHLORIDE 10 MG/ML
INJECTION, SOLUTION EPIDURAL; INFILTRATION; INTRACAUDAL; PERINEURAL
Status: DISPENSED
Start: 2017-01-26

## (undated) RX ORDER — LIDOCAINE HYDROCHLORIDE 10 MG/ML
INJECTION, SOLUTION INFILTRATION; PERINEURAL
Status: DISPENSED
Start: 2020-08-06